# Patient Record
Sex: FEMALE | Race: ASIAN | NOT HISPANIC OR LATINO
[De-identification: names, ages, dates, MRNs, and addresses within clinical notes are randomized per-mention and may not be internally consistent; named-entity substitution may affect disease eponyms.]

---

## 2016-12-23 RX ORDER — SODIUM CHLORIDE 9 MG/ML
1000 INJECTION, SOLUTION INTRAVENOUS
Qty: 0 | Refills: 0 | Status: DISCONTINUED | OUTPATIENT
Start: 2017-01-06 | End: 2017-01-06

## 2016-12-23 RX ORDER — SODIUM CHLORIDE 9 MG/ML
3 INJECTION INTRAMUSCULAR; INTRAVENOUS; SUBCUTANEOUS EVERY 8 HOURS
Qty: 0 | Refills: 0 | Status: DISCONTINUED | OUTPATIENT
Start: 2017-01-06 | End: 2017-01-06

## 2016-12-23 NOTE — H&P PST ADULT - NEGATIVE MUSCULOSKELETAL SYMPTOMS
no muscle cramps/no arm pain R/no myalgia/no back pain/no stiffness/no arthralgia/no joint swelling/no arthritis/no leg pain L/no neck pain/no arm pain L/no leg pain R/no muscle weakness

## 2016-12-23 NOTE — H&P PST ADULT - RS GEN PE MLT RESP DETAILS PC
no rhonchi/no chest wall tenderness/no intercostal retractions/no rales/airway patent/respirations non-labored/breath sounds equal/clear to auscultation bilaterally/good air movement/no subcutaneous emphysema/no wheezes

## 2016-12-23 NOTE — H&P PST ADULT - NEGATIVE GENERAL SYMPTOMS
no weight loss/no sweating/no fever/no anorexia/no fatigue/no weight gain/no polyuria/no polydipsia/no malaise/no polyphagia

## 2016-12-23 NOTE — H&P PST ADULT - NEGATIVE CARDIOVASCULAR SYMPTOMS
no palpitations/no peripheral edema/no chest pain/no claudication/no orthopnea/no dyspnea on exertion

## 2016-12-23 NOTE — H&P PST ADULT - PMH
Enlargement of Lymph Node    History of chemotherapy  2011  History of radiation therapy  2011  Malignant neoplasm of nasopharynx, unspecified

## 2016-12-23 NOTE — H&P PST ADULT - NEGATIVE ENMT SYMPTOMS
no nasal obstruction/no recurrent cold sores/no throat pain/no dry mouth/no nasal congestion/no sinus symptoms/no abnormal taste sensation/no post-nasal discharge/no ear pain/no nasal discharge/no gum bleeding/no hearing difficulty/no nose bleeds/no dysphagia

## 2016-12-23 NOTE — H&P PST ADULT - HISTORY OF PRESENT ILLNESS
57 y/o  female with right neck mass x 2 months s/p biopsy and PET scan.  Pt is now scheduled for Direct Laryngoscopy with biopsy, Tonsillectomy on 08/26/11. 55 y/o  female with hx of right neck mass in 2011. S/P Direct Laryngoscopy with biopsy, Tonsillectomy on 08/26/11. S/P chemo / radiation therapy (10/2011). Pt presents to PST for pre op evaluation stated that follow up MRI x2 (09/2016, 10/2016) were abnormal. S/P biopsy of neck 10/2016. Pre op diagnosis malignant neoplasm of nasopharynx, unspecified. Now scheduled for right neck dissection, possible right parotidectomy on 01/06/2017

## 2016-12-23 NOTE — H&P PST ADULT - FAMILY HISTORY
Mother  Still living? Yes, Estimated age: 81-90  Hypertension, Age at diagnosis: Age Unknown     Sibling  Still living? No  Type 2 diabetes mellitus, Age at diagnosis: Age Unknown

## 2016-12-23 NOTE — H&P PST ADULT - NEGATIVE NEUROLOGICAL SYMPTOMS
no tremors/no syncope/no loss of consciousness/no hemiparesis/no paresthesias/no loss of sensation/no weakness/no generalized seizures/no transient paralysis/no difficulty walking/no facial palsy/no vertigo/no focal seizures

## 2016-12-23 NOTE — H&P PST ADULT - NEGATIVE GENERAL GENITOURINARY SYMPTOMS
no hematuria/no flank pain R/no flank pain L/no bladder infections/no renal colic/normal libido/no urinary hesitancy/no incontinence no flank pain R/no bladder infections/no incontinence/no urinary hesitancy/no renal colic/normal libido/no dysuria/no nocturia/normal urinary frequency/no hematuria/no flank pain L

## 2016-12-23 NOTE — H&P PST ADULT - NEGATIVE GASTROINTESTINAL SYMPTOMS
no abdominal pain/no vomiting/no constipation/no nausea/no diarrhea/no change in bowel habits/no melena

## 2016-12-23 NOTE — H&P PST ADULT - NEGATIVE SKIN SYMPTOMS
no dryness/no brittle nails/no change in size/color of mole/no pitted nails/no hair loss/no itching/no rash no rash/no brittle nails/no pitted nails/no change in size/color of mole/no hair loss/no itching

## 2016-12-23 NOTE — H&P PST ADULT - NSANTHOSAYNRD_GEN_A_CORE
No. JAKE screening performed.  STOP BANG Legend: 0-2 = LOW Risk; 3-4 = INTERMEDIATE Risk; 5-8 = HIGH Risk

## 2016-12-23 NOTE — H&P PST ADULT - NEGATIVE BREAST SYMPTOMS
no nipple discharge L/no breast tenderness R/no breast lump R/no nipple discharge R/no breast tenderness L/no breast lump L

## 2016-12-23 NOTE — H&P PST ADULT - PROBLEM SELECTOR PLAN 1
Scheduled for right neck dissection, possible right parotidectomy on 01/06/17. Pre op instructions, famotidine given and explained. Pt verbalized understanding.

## 2016-12-23 NOTE — H&P PST ADULT - NEGATIVE OPHTHALMOLOGIC SYMPTOMS
no discharge L/no irritation R/no loss of vision L/no discharge R/no blurred vision L/no loss of vision R/no scleral injection L/no lacrimation L/no pain R/no blurred vision R/no irritation L/no pain L/no scleral injection R/no lacrimation R/no diplopia/no photophobia

## 2017-01-05 ENCOUNTER — RESULT REVIEW (OUTPATIENT)
Age: 57
End: 2017-01-05

## 2017-01-06 ENCOUNTER — OUTPATIENT (OUTPATIENT)
Dept: INPATIENT UNIT | Facility: HOSPITAL | Age: 57
LOS: 1 days | Discharge: HOME | End: 2017-01-06
Payer: MEDICAID

## 2017-01-06 ENCOUNTER — APPOINTMENT (OUTPATIENT)
Dept: OTOLARYNGOLOGY | Facility: HOSPITAL | Age: 57
End: 2017-01-06

## 2017-01-06 VITALS
SYSTOLIC BLOOD PRESSURE: 151 MMHG | HEART RATE: 86 BPM | DIASTOLIC BLOOD PRESSURE: 107 MMHG | RESPIRATION RATE: 16 BRPM | WEIGHT: 117.95 LBS | OXYGEN SATURATION: 96 % | TEMPERATURE: 98 F | HEIGHT: 61.75 IN

## 2017-01-06 VITALS
DIASTOLIC BLOOD PRESSURE: 90 MMHG | TEMPERATURE: 98 F | OXYGEN SATURATION: 100 % | SYSTOLIC BLOOD PRESSURE: 135 MMHG | HEART RATE: 88 BPM | RESPIRATION RATE: 16 BRPM

## 2017-01-06 DIAGNOSIS — C11.9 MALIGNANT NEOPLASM OF NASOPHARYNX, UNSPECIFIED: ICD-10-CM

## 2017-01-06 DIAGNOSIS — Z98.89 OTHER SPECIFIED POSTPROCEDURAL STATES: Chronic | ICD-10-CM

## 2017-01-06 LAB
HCG UR QL: NEGATIVE — SIGNIFICANT CHANGE UP
RH IG SCN BLD-IMP: POSITIVE — SIGNIFICANT CHANGE UP

## 2017-01-06 PROCEDURE — 88365 INSITU HYBRIDIZATION (FISH): CPT | Mod: 26,59

## 2017-01-06 PROCEDURE — 88307 TISSUE EXAM BY PATHOLOGIST: CPT | Mod: 26

## 2017-01-06 PROCEDURE — 88342 IMHCHEM/IMCYTCHM 1ST ANTB: CPT | Mod: 26

## 2017-01-06 PROCEDURE — 42415 EXCISE PAROTID GLAND/LESION: CPT

## 2017-01-06 PROCEDURE — 88367 INSITU HYBRIDIZATION AUTO: CPT | Mod: 26

## 2017-01-06 PROCEDURE — 88364 INSITU HYBRIDIZATION (FISH): CPT | Mod: 26

## 2017-01-06 RX ORDER — SODIUM CHLORIDE 9 MG/ML
1000 INJECTION, SOLUTION INTRAVENOUS
Qty: 0 | Refills: 0 | Status: DISCONTINUED | OUTPATIENT
Start: 2017-01-06 | End: 2017-01-06

## 2017-01-06 RX ORDER — FENTANYL CITRATE 50 UG/ML
50 INJECTION INTRAVENOUS
Qty: 0 | Refills: 0 | Status: DISCONTINUED | OUTPATIENT
Start: 2017-01-06 | End: 2017-01-06

## 2017-01-06 RX ORDER — OXYCODONE HYDROCHLORIDE 5 MG/1
1 TABLET ORAL
Qty: 20 | Refills: 0 | OUTPATIENT
Start: 2017-01-06 | End: 2017-01-11

## 2017-01-06 RX ORDER — ONDANSETRON 8 MG/1
4 TABLET, FILM COATED ORAL ONCE
Qty: 0 | Refills: 0 | Status: DISCONTINUED | OUTPATIENT
Start: 2017-01-06 | End: 2017-01-06

## 2017-01-06 RX ADMIN — FENTANYL CITRATE 50 MICROGRAM(S): 50 INJECTION INTRAVENOUS at 10:15

## 2017-01-06 RX ADMIN — SODIUM CHLORIDE 60 MILLILITER(S): 9 INJECTION, SOLUTION INTRAVENOUS at 09:45

## 2017-01-06 RX ADMIN — FENTANYL CITRATE 50 MICROGRAM(S): 50 INJECTION INTRAVENOUS at 10:30

## 2017-01-06 RX ADMIN — FENTANYL CITRATE 50 MICROGRAM(S): 50 INJECTION INTRAVENOUS at 09:57

## 2017-01-06 NOTE — ASU DISCHARGE PLAN (ADULT/PEDIATRIC). - NOTIFY
Bleeding that does not stop/Inability to Tolerate Liquids or Foods/Swelling that continues/Fever greater than 101 Swelling that continues/Increased Irritability or Sluggishness/Bleeding that does not stop/Pain not relieved by Medications/Fever greater than 101/Inability to Tolerate Liquids or Foods/Persistent Nausea and Vomiting Bleeding that does not stop/Fever greater than 101/Increased Irritability or Sluggishness/Swelling that continues/Inability to Tolerate Liquids or Foods/Unable to Urinate/Pain not relieved by Medications/Persistent Nausea and Vomiting

## 2017-01-06 NOTE — ASU DISCHARGE PLAN (ADULT/PEDIATRIC). - NURSING INSTRUCTIONS
Advise pt to not take advil x 5 days and while taking percocet Advise pt to not take advil x 5 days and while taking percocet  1 Percocet contains 325mg. of Tylenol (ACETAMINOPHEN) . DO NOT EXCEED 3000mg  of Tylenol in a 24 hour period.

## 2017-01-06 NOTE — ASU DISCHARGE PLAN (ADULT/PEDIATRIC). - MEDICATION SUMMARY - MEDICATIONS TO TAKE
I will START or STAY ON the medications listed below when I get home from the hospital:    acetaminophen-oxyCODONE 325 mg-5 mg oral tablet  -- 1 tab(s) by mouth every 6 hours MDD:4 tabs  -- Caution federal law prohibits the transfer of this drug to any person other  than the person for whom it was prescribed.  May cause drowsiness.  Alcohol may intensify this effect.  Use care when operating dangerous machinery.  This prescription cannot be refilled.  This product contains acetaminophen.  Do not use  with any other product containing acetaminophen to prevent possible liver damage.  Using more of this medication than prescribed may cause serious breathing problems.    -- Indication: For pain    Advil 200 mg oral tablet  -- 2 tab(s) by mouth every 6 hours, As Needed last dose 12/23/2016  -- Indication: For prn

## 2017-01-09 ENCOUNTER — OTHER (OUTPATIENT)
Age: 57
End: 2017-01-09

## 2017-01-17 ENCOUNTER — APPOINTMENT (OUTPATIENT)
Dept: OTOLARYNGOLOGY | Facility: CLINIC | Age: 57
End: 2017-01-17

## 2017-01-17 VITALS
DIASTOLIC BLOOD PRESSURE: 89 MMHG | SYSTOLIC BLOOD PRESSURE: 133 MMHG | HEART RATE: 82 BPM | HEIGHT: 63 IN | BODY MASS INDEX: 20.73 KG/M2 | WEIGHT: 117 LBS

## 2017-01-26 ENCOUNTER — APPOINTMENT (OUTPATIENT)
Dept: DERMATOLOGY | Facility: CLINIC | Age: 57
End: 2017-01-26

## 2017-01-26 VITALS
WEIGHT: 117 LBS | SYSTOLIC BLOOD PRESSURE: 134 MMHG | DIASTOLIC BLOOD PRESSURE: 86 MMHG | BODY MASS INDEX: 20.73 KG/M2 | HEIGHT: 63 IN

## 2017-05-22 ENCOUNTER — APPOINTMENT (OUTPATIENT)
Dept: OTOLARYNGOLOGY | Facility: CLINIC | Age: 57
End: 2017-05-22

## 2017-05-22 VITALS
BODY MASS INDEX: 20.73 KG/M2 | HEIGHT: 63 IN | WEIGHT: 117 LBS | DIASTOLIC BLOOD PRESSURE: 110 MMHG | SYSTOLIC BLOOD PRESSURE: 170 MMHG | HEART RATE: 72 BPM

## 2017-05-22 RX ORDER — IBUPROFEN 400 MG/1
400 TABLET, FILM COATED ORAL
Qty: 40 | Refills: 0 | Status: COMPLETED | COMMUNITY
Start: 2017-01-17 | End: 2017-05-22

## 2017-06-11 ENCOUNTER — FORM ENCOUNTER (OUTPATIENT)
Age: 57
End: 2017-06-11

## 2017-06-12 ENCOUNTER — OUTPATIENT (OUTPATIENT)
Dept: OUTPATIENT SERVICES | Facility: HOSPITAL | Age: 57
LOS: 1 days | End: 2017-06-12
Payer: MEDICAID

## 2017-06-12 ENCOUNTER — APPOINTMENT (OUTPATIENT)
Dept: CT IMAGING | Facility: IMAGING CENTER | Age: 57
End: 2017-06-12

## 2017-06-12 ENCOUNTER — APPOINTMENT (OUTPATIENT)
Dept: MRI IMAGING | Facility: IMAGING CENTER | Age: 57
End: 2017-06-12

## 2017-06-12 DIAGNOSIS — C77.0 SECONDARY AND UNSPECIFIED MALIGNANT NEOPLASM OF LYMPH NODES OF HEAD, FACE AND NECK: ICD-10-CM

## 2017-06-12 DIAGNOSIS — C11.9 MALIGNANT NEOPLASM OF NASOPHARYNX, UNSPECIFIED: ICD-10-CM

## 2017-06-12 DIAGNOSIS — Z98.89 OTHER SPECIFIED POSTPROCEDURAL STATES: Chronic | ICD-10-CM

## 2017-06-12 PROCEDURE — 71250 CT THORAX DX C-: CPT

## 2017-06-12 PROCEDURE — A9585: CPT

## 2017-06-12 PROCEDURE — 70543 MRI ORBT/FAC/NCK W/O &W/DYE: CPT

## 2017-06-21 ENCOUNTER — FORM ENCOUNTER (OUTPATIENT)
Age: 57
End: 2017-06-21

## 2017-06-22 ENCOUNTER — APPOINTMENT (OUTPATIENT)
Dept: ULTRASOUND IMAGING | Facility: IMAGING CENTER | Age: 57
End: 2017-06-22

## 2017-06-22 ENCOUNTER — OUTPATIENT (OUTPATIENT)
Dept: OUTPATIENT SERVICES | Facility: HOSPITAL | Age: 57
LOS: 1 days | End: 2017-06-22
Payer: MEDICAID

## 2017-06-22 ENCOUNTER — RESULT REVIEW (OUTPATIENT)
Age: 57
End: 2017-06-22

## 2017-06-22 DIAGNOSIS — Z98.89 OTHER SPECIFIED POSTPROCEDURAL STATES: Chronic | ICD-10-CM

## 2017-06-22 DIAGNOSIS — K11.8 OTHER DISEASES OF SALIVARY GLANDS: ICD-10-CM

## 2017-06-22 DIAGNOSIS — C11.9 MALIGNANT NEOPLASM OF NASOPHARYNX, UNSPECIFIED: ICD-10-CM

## 2017-06-22 PROCEDURE — 88365 INSITU HYBRIDIZATION (FISH): CPT

## 2017-06-22 PROCEDURE — 88173 CYTOPATH EVAL FNA REPORT: CPT

## 2017-06-22 PROCEDURE — 88305 TISSUE EXAM BY PATHOLOGIST: CPT

## 2017-06-22 PROCEDURE — 76942 ECHO GUIDE FOR BIOPSY: CPT

## 2017-06-22 PROCEDURE — 88172 CYTP DX EVAL FNA 1ST EA SITE: CPT

## 2017-06-22 PROCEDURE — 88341 IMHCHEM/IMCYTCHM EA ADD ANTB: CPT

## 2017-06-22 PROCEDURE — 10022: CPT

## 2017-06-22 PROCEDURE — 88342 IMHCHEM/IMCYTCHM 1ST ANTB: CPT

## 2017-07-11 ENCOUNTER — APPOINTMENT (OUTPATIENT)
Dept: OTOLARYNGOLOGY | Facility: CLINIC | Age: 57
End: 2017-07-11

## 2017-07-17 ENCOUNTER — OUTPATIENT (OUTPATIENT)
Dept: OUTPATIENT SERVICES | Facility: HOSPITAL | Age: 57
LOS: 1 days | Discharge: HOME | End: 2017-07-17

## 2017-07-17 DIAGNOSIS — Z98.89 OTHER SPECIFIED POSTPROCEDURAL STATES: Chronic | ICD-10-CM

## 2017-07-18 ENCOUNTER — APPOINTMENT (OUTPATIENT)
Dept: RADIATION ONCOLOGY | Facility: CLINIC | Age: 57
End: 2017-07-18

## 2017-07-18 VITALS
HEART RATE: 73 BPM | DIASTOLIC BLOOD PRESSURE: 99 MMHG | TEMPERATURE: 96.9 F | HEIGHT: 63 IN | RESPIRATION RATE: 19 BRPM | WEIGHT: 118.5 LBS | BODY MASS INDEX: 21 KG/M2 | SYSTOLIC BLOOD PRESSURE: 158 MMHG | OXYGEN SATURATION: 100 %

## 2017-07-18 RX ORDER — OXYCODONE AND ACETAMINOPHEN 5; 325 MG/1; MG/1
5-325 TABLET ORAL
Qty: 28 | Refills: 0 | Status: DISCONTINUED | COMMUNITY
Start: 2017-01-17 | End: 2017-07-18

## 2017-08-08 ENCOUNTER — OUTPATIENT (OUTPATIENT)
Dept: OUTPATIENT SERVICES | Facility: HOSPITAL | Age: 57
LOS: 1 days | Discharge: HOME | End: 2017-08-08

## 2017-08-08 ENCOUNTER — APPOINTMENT (OUTPATIENT)
Dept: HEMATOLOGY ONCOLOGY | Facility: CLINIC | Age: 57
End: 2017-08-08
Payer: MEDICAID

## 2017-08-08 VITALS
RESPIRATION RATE: 20 BRPM | HEART RATE: 80 BPM | SYSTOLIC BLOOD PRESSURE: 166 MMHG | BODY MASS INDEX: 20.89 KG/M2 | TEMPERATURE: 97.5 F | HEIGHT: 62.72 IN | WEIGHT: 116.4 LBS | OXYGEN SATURATION: 100 % | DIASTOLIC BLOOD PRESSURE: 110 MMHG

## 2017-08-08 DIAGNOSIS — Z98.89 OTHER SPECIFIED POSTPROCEDURAL STATES: Chronic | ICD-10-CM

## 2017-08-08 DIAGNOSIS — C34.90 MALIGNANT NEOPLASM OF UNSPECIFIED PART OF UNSPECIFIED BRONCHUS OR LUNG: ICD-10-CM

## 2017-08-08 PROCEDURE — 99205 OFFICE O/P NEW HI 60 MIN: CPT

## 2017-08-09 DIAGNOSIS — C11.9 MALIGNANT NEOPLASM OF NASOPHARYNX, UNSPECIFIED: ICD-10-CM

## 2017-08-21 ENCOUNTER — APPOINTMENT (OUTPATIENT)
Dept: OTOLARYNGOLOGY | Facility: CLINIC | Age: 57
End: 2017-08-21
Payer: MEDICAID

## 2017-08-21 PROCEDURE — 99214 OFFICE O/P EST MOD 30 MIN: CPT | Mod: 25

## 2017-08-21 PROCEDURE — 31231 NASAL ENDOSCOPY DX: CPT

## 2017-08-28 ENCOUNTER — FORM ENCOUNTER (OUTPATIENT)
Age: 57
End: 2017-08-28

## 2017-08-29 ENCOUNTER — APPOINTMENT (OUTPATIENT)
Dept: NUCLEAR MEDICINE | Facility: IMAGING CENTER | Age: 57
End: 2017-08-29
Payer: MEDICAID

## 2017-08-29 ENCOUNTER — OUTPATIENT (OUTPATIENT)
Dept: OUTPATIENT SERVICES | Facility: HOSPITAL | Age: 57
LOS: 1 days | End: 2017-08-29
Payer: MEDICAID

## 2017-08-29 DIAGNOSIS — C77.0 SECONDARY AND UNSPECIFIED MALIGNANT NEOPLASM OF LYMPH NODES OF HEAD, FACE AND NECK: ICD-10-CM

## 2017-08-29 DIAGNOSIS — Z98.89 OTHER SPECIFIED POSTPROCEDURAL STATES: Chronic | ICD-10-CM

## 2017-08-29 PROCEDURE — 78815 PET IMAGE W/CT SKULL-THIGH: CPT

## 2017-08-29 PROCEDURE — A9552: CPT

## 2017-08-29 PROCEDURE — 78815 PET IMAGE W/CT SKULL-THIGH: CPT | Mod: 26,PS

## 2017-09-07 ENCOUNTER — FORM ENCOUNTER (OUTPATIENT)
Age: 57
End: 2017-09-07

## 2017-09-08 ENCOUNTER — RESULT REVIEW (OUTPATIENT)
Age: 57
End: 2017-09-08

## 2017-09-08 ENCOUNTER — APPOINTMENT (OUTPATIENT)
Dept: ULTRASOUND IMAGING | Facility: IMAGING CENTER | Age: 57
End: 2017-09-08
Payer: MEDICAID

## 2017-09-08 ENCOUNTER — OUTPATIENT (OUTPATIENT)
Dept: OUTPATIENT SERVICES | Facility: HOSPITAL | Age: 57
LOS: 1 days | End: 2017-09-08
Payer: MEDICAID

## 2017-09-08 DIAGNOSIS — C77.0 SECONDARY AND UNSPECIFIED MALIGNANT NEOPLASM OF LYMPH NODES OF HEAD, FACE AND NECK: ICD-10-CM

## 2017-09-08 DIAGNOSIS — Z98.89 OTHER SPECIFIED POSTPROCEDURAL STATES: Chronic | ICD-10-CM

## 2017-09-08 DIAGNOSIS — C11.9 MALIGNANT NEOPLASM OF NASOPHARYNX, UNSPECIFIED: ICD-10-CM

## 2017-09-08 PROCEDURE — 88173 CYTOPATH EVAL FNA REPORT: CPT

## 2017-09-08 PROCEDURE — 88173 CYTOPATH EVAL FNA REPORT: CPT | Mod: 26

## 2017-09-08 PROCEDURE — 88172 CYTP DX EVAL FNA 1ST EA SITE: CPT

## 2017-09-08 PROCEDURE — 76942 ECHO GUIDE FOR BIOPSY: CPT | Mod: 26

## 2017-09-08 PROCEDURE — 10022: CPT

## 2017-09-08 PROCEDURE — 88305 TISSUE EXAM BY PATHOLOGIST: CPT

## 2017-09-08 PROCEDURE — 76942 ECHO GUIDE FOR BIOPSY: CPT

## 2017-09-08 PROCEDURE — 88305 TISSUE EXAM BY PATHOLOGIST: CPT | Mod: 26

## 2017-09-21 ENCOUNTER — OUTPATIENT (OUTPATIENT)
Dept: OUTPATIENT SERVICES | Facility: HOSPITAL | Age: 57
LOS: 1 days | End: 2017-09-21
Payer: MEDICAID

## 2017-09-21 VITALS
DIASTOLIC BLOOD PRESSURE: 100 MMHG | SYSTOLIC BLOOD PRESSURE: 162 MMHG | WEIGHT: 115.08 LBS | TEMPERATURE: 98 F | OXYGEN SATURATION: 98 % | RESPIRATION RATE: 16 BRPM | HEIGHT: 62.5 IN | HEART RATE: 78 BPM

## 2017-09-21 DIAGNOSIS — Z98.89 OTHER SPECIFIED POSTPROCEDURAL STATES: Chronic | ICD-10-CM

## 2017-09-21 DIAGNOSIS — C11.9 MALIGNANT NEOPLASM OF NASOPHARYNX, UNSPECIFIED: ICD-10-CM

## 2017-09-21 LAB
ALBUMIN SERPL ELPH-MCNC: 4.4 G/DL — SIGNIFICANT CHANGE UP (ref 3.3–5)
ALP SERPL-CCNC: 87 U/L — SIGNIFICANT CHANGE UP (ref 40–120)
ALT FLD-CCNC: 19 U/L — SIGNIFICANT CHANGE UP (ref 4–33)
AST SERPL-CCNC: 23 U/L — SIGNIFICANT CHANGE UP (ref 4–32)
BILIRUB SERPL-MCNC: 0.3 MG/DL — SIGNIFICANT CHANGE UP (ref 0.2–1.2)
BUN SERPL-MCNC: 13 MG/DL — SIGNIFICANT CHANGE UP (ref 7–23)
CALCIUM SERPL-MCNC: 9.3 MG/DL — SIGNIFICANT CHANGE UP (ref 8.4–10.5)
CHLORIDE SERPL-SCNC: 103 MMOL/L — SIGNIFICANT CHANGE UP (ref 98–107)
CO2 SERPL-SCNC: 27 MMOL/L — SIGNIFICANT CHANGE UP (ref 22–31)
CREAT SERPL-MCNC: 0.66 MG/DL — SIGNIFICANT CHANGE UP (ref 0.5–1.3)
GLUCOSE SERPL-MCNC: 88 MG/DL — SIGNIFICANT CHANGE UP (ref 70–99)
HCT VFR BLD CALC: 39.5 % — SIGNIFICANT CHANGE UP (ref 34.5–45)
HGB BLD-MCNC: 13.6 G/DL — SIGNIFICANT CHANGE UP (ref 11.5–15.5)
MCHC RBC-ENTMCNC: 30.8 PG — SIGNIFICANT CHANGE UP (ref 27–34)
MCHC RBC-ENTMCNC: 34.4 % — SIGNIFICANT CHANGE UP (ref 32–36)
MCV RBC AUTO: 89.4 FL — SIGNIFICANT CHANGE UP (ref 80–100)
NRBC # FLD: 0 — SIGNIFICANT CHANGE UP
PLATELET # BLD AUTO: 263 K/UL — SIGNIFICANT CHANGE UP (ref 150–400)
PMV BLD: 9.5 FL — SIGNIFICANT CHANGE UP (ref 7–13)
POTASSIUM SERPL-MCNC: 3.8 MMOL/L — SIGNIFICANT CHANGE UP (ref 3.5–5.3)
POTASSIUM SERPL-SCNC: 3.8 MMOL/L — SIGNIFICANT CHANGE UP (ref 3.5–5.3)
PROT SERPL-MCNC: 7.3 G/DL — SIGNIFICANT CHANGE UP (ref 6–8.3)
RBC # BLD: 4.42 M/UL — SIGNIFICANT CHANGE UP (ref 3.8–5.2)
RBC # FLD: 12.9 % — SIGNIFICANT CHANGE UP (ref 10.3–14.5)
SODIUM SERPL-SCNC: 143 MMOL/L — SIGNIFICANT CHANGE UP (ref 135–145)
WBC # BLD: 5.17 K/UL — SIGNIFICANT CHANGE UP (ref 3.8–10.5)
WBC # FLD AUTO: 5.17 K/UL — SIGNIFICANT CHANGE UP (ref 3.8–10.5)

## 2017-09-21 PROCEDURE — 93010 ELECTROCARDIOGRAM REPORT: CPT

## 2017-09-21 RX ORDER — SODIUM CHLORIDE 9 MG/ML
1000 INJECTION, SOLUTION INTRAVENOUS
Qty: 0 | Refills: 0 | Status: DISCONTINUED | OUTPATIENT
Start: 2017-10-04 | End: 2017-10-04

## 2017-09-21 NOTE — H&P PST ADULT - ABILITY TO HEAR (WITH HEARING AID OR HEARING APPLIANCE IF NORMALLY USED):
Adequate: hears normal conversation without difficulty right ear has diminished hearing/Mildly to Moderately Impaired: difficulty hearing in some environments or speaker may need to increase volume or speak distinctly

## 2017-09-21 NOTE — H&P PST ADULT - NEGATIVE NEUROLOGICAL SYMPTOMS
no loss of consciousness/no hemiparesis/no facial palsy/no difficulty walking/no generalized seizures/no paresthesias/no focal seizures/no syncope/no transient paralysis/no vertigo/no loss of sensation/no tremors/no weakness

## 2017-09-21 NOTE — H&P PST ADULT - NEGATIVE CARDIOVASCULAR SYMPTOMS
no chest pain/no dyspnea on exertion/no palpitations/no claudication/no orthopnea/no peripheral edema

## 2017-09-21 NOTE — H&P PST ADULT - LANGUAGE ASSISTANCE NEEDED
speaking English at this PST visit  with full understanding/No-Patient/Caregiver offered and refused free interpretation services.

## 2017-09-21 NOTE — H&P PST ADULT - NEGATIVE GENERAL GENITOURINARY SYMPTOMS
no incontinence/normal urinary frequency/no nocturia/no bladder infections/no hematuria/no renal colic/no flank pain L/no flank pain R/normal libido/no dysuria/no urinary hesitancy

## 2017-09-21 NOTE — H&P PST ADULT - NEGATIVE OPHTHALMOLOGIC SYMPTOMS
no discharge L/no diplopia/no irritation L/no blurred vision L/no blurred vision R/no loss of vision R/no scleral injection R/no scleral injection L/no discharge R/no pain L/no lacrimation L/no photophobia/no pain R/no lacrimation R/no irritation R/no loss of vision L no blurred vision L/no blurred vision R/no diplopia/no photophobia

## 2017-09-21 NOTE — H&P PST ADULT - ENT GEN HX ROS MEA POS PC
epistaxis/tinnitus/vertigo/"The doctor said the nose bleed is from the radiation" right parotid mass

## 2017-09-21 NOTE — H&P PST ADULT - NEGATIVE SKIN SYMPTOMS
no itching/no pitted nails/no change in size/color of mole/no rash/no brittle nails/no hair loss no itching/no hair loss/no change in size/color of mole/no brittle nails/no pitted nails

## 2017-09-21 NOTE — H&P PST ADULT - NEGATIVE ENMT SYMPTOMS
no sinus symptoms/no nasal congestion/no nasal obstruction/no post-nasal discharge/no nose bleeds/no recurrent cold sores/no ear pain/no gum bleeding/no nasal discharge/no hearing difficulty/no throat pain/no dysphagia/no abnormal taste sensation/no dry mouth no nasal obstruction/no hearing difficulty/no dysphagia/no nasal congestion/no nasal discharge/no recurrent cold sores/no post-nasal discharge/no nose bleeds/no sinus symptoms

## 2017-09-21 NOTE — H&P PST ADULT - NEGATIVE GASTROINTESTINAL SYMPTOMS
no change in bowel habits/no nausea/no constipation/no abdominal pain/no diarrhea/no melena/no vomiting

## 2017-09-21 NOTE — H&P PST ADULT - NEGATIVE BREAST SYMPTOMS
no nipple discharge L/no breast lump L/no breast lump R/no nipple discharge R/no breast tenderness R/no breast tenderness L

## 2017-09-21 NOTE — H&P PST ADULT - PROBLEM SELECTOR PLAN 1
scheduled for right parotidectomy with frozen section on 10/4/2017  preop instructions, Gi prophylaxis & surgical soap given  pt verbalized understanding

## 2017-09-21 NOTE — H&P PST ADULT - NEGATIVE MUSCULOSKELETAL SYMPTOMS
no arm pain L/no muscle cramps/no arthritis/no joint swelling/no stiffness/no arm pain R/no leg pain L/no myalgia/no arthralgia/no muscle weakness/no neck pain/no back pain/no leg pain R

## 2017-09-21 NOTE — H&P PST ADULT - PMH
Enlargement of Lymph Node    History of chemotherapy  2011  History of radiation therapy  2011  Malignant neoplasm of nasopharynx, unspecified Enlargement of Lymph Node    History of chemotherapy  2011  History of radiation therapy  2011  Malignant neoplasm of nasopharynx, unspecified    Psoriasis

## 2017-09-22 ENCOUNTER — NON-APPOINTMENT (OUTPATIENT)
Age: 57
End: 2017-09-22

## 2017-09-22 ENCOUNTER — APPOINTMENT (OUTPATIENT)
Dept: INTERNAL MEDICINE | Facility: CLINIC | Age: 57
End: 2017-09-22
Payer: MEDICAID

## 2017-09-22 VITALS
DIASTOLIC BLOOD PRESSURE: 91 MMHG | SYSTOLIC BLOOD PRESSURE: 137 MMHG | OXYGEN SATURATION: 99 % | RESPIRATION RATE: 12 BRPM | TEMPERATURE: 98.3 F | HEIGHT: 62 IN | WEIGHT: 117 LBS | BODY MASS INDEX: 21.53 KG/M2 | HEART RATE: 71 BPM

## 2017-09-22 DIAGNOSIS — Z01.818 ENCOUNTER FOR OTHER PREPROCEDURAL EXAMINATION: ICD-10-CM

## 2017-09-22 PROCEDURE — 99214 OFFICE O/P EST MOD 30 MIN: CPT

## 2017-09-22 PROCEDURE — 93000 ELECTROCARDIOGRAM COMPLETE: CPT

## 2017-09-22 RX ORDER — DEXAMETHASONE 4 MG/1
4 TABLET ORAL TWICE DAILY
Qty: 60 | Refills: 2 | Status: DISCONTINUED | COMMUNITY
Start: 2017-08-08 | End: 2017-09-22

## 2017-09-22 RX ORDER — METOCLOPRAMIDE 10 MG/1
10 TABLET ORAL
Qty: 60 | Refills: 3 | Status: DISCONTINUED | COMMUNITY
Start: 2017-08-08 | End: 2017-09-22

## 2017-09-25 LAB
ALBUMIN SERPL ELPH-MCNC: 4.6 G/DL
ALP BLD-CCNC: 91 U/L
ALT SERPL-CCNC: 18 U/L
ANION GAP SERPL CALC-SCNC: 15 MMOL/L
APTT BLD: 38.9 SEC
AST SERPL-CCNC: 25 U/L
BASOPHILS # BLD AUTO: 0.02 K/UL
BASOPHILS NFR BLD AUTO: 0.4 %
BILIRUB SERPL-MCNC: 0.4 MG/DL
BUN SERPL-MCNC: 18 MG/DL
CALCIUM SERPL-MCNC: 9.9 MG/DL
CHLORIDE SERPL-SCNC: 101 MMOL/L
CO2 SERPL-SCNC: 25 MMOL/L
CREAT SERPL-MCNC: 0.75 MG/DL
EOSINOPHIL # BLD AUTO: 0.09 K/UL
EOSINOPHIL NFR BLD AUTO: 2 %
GLUCOSE SERPL-MCNC: 92 MG/DL
HCT VFR BLD CALC: 41 %
HGB BLD-MCNC: 13.1 G/DL
IMM GRANULOCYTES NFR BLD AUTO: 0.2 %
INR PPP: 0.92 RATIO
LYMPHOCYTES # BLD AUTO: 1.07 K/UL
LYMPHOCYTES NFR BLD AUTO: 23.9 %
MAN DIFF?: NORMAL
MCHC RBC-ENTMCNC: 29.9 PG
MCHC RBC-ENTMCNC: 32 GM/DL
MCV RBC AUTO: 93.6 FL
MONOCYTES # BLD AUTO: 0.26 K/UL
MONOCYTES NFR BLD AUTO: 5.8 %
NEUTROPHILS # BLD AUTO: 3.03 K/UL
NEUTROPHILS NFR BLD AUTO: 67.7 %
PLATELET # BLD AUTO: 269 K/UL
POTASSIUM SERPL-SCNC: 4.3 MMOL/L
PROT SERPL-MCNC: 7.1 G/DL
PT BLD: 10.4 SEC
RBC # BLD: 4.38 M/UL
RBC # FLD: 14 %
SODIUM SERPL-SCNC: 141 MMOL/L
WBC # FLD AUTO: 4.48 K/UL

## 2017-10-03 NOTE — ASU PATIENT PROFILE, ADULT - PMH
Enlargement of Lymph Node    History of chemotherapy  2011  History of radiation therapy  2011  Malignant neoplasm of nasopharynx, unspecified    Psoriasis

## 2017-10-03 NOTE — ASU PATIENT PROFILE, ADULT - LANGUAGE ASSISTANCE NEEDED
No-Patient/Caregiver offered and refused free interpretation services./speaking English at this PST visit  with full understanding

## 2017-10-03 NOTE — ASU PATIENT PROFILE, ADULT - ABILITY TO HEAR (WITH HEARING AID OR HEARING APPLIANCE IF NORMALLY USED):
right ear has diminished hearing/Mildly to Moderately Impaired: difficulty hearing in some environments or speaker may need to increase volume or speak distinctly

## 2017-10-04 ENCOUNTER — RESULT REVIEW (OUTPATIENT)
Age: 57
End: 2017-10-04

## 2017-10-04 ENCOUNTER — OUTPATIENT (OUTPATIENT)
Dept: OUTPATIENT SERVICES | Facility: HOSPITAL | Age: 57
LOS: 1 days | Discharge: HOME | End: 2017-10-04
Payer: MEDICAID

## 2017-10-04 ENCOUNTER — APPOINTMENT (OUTPATIENT)
Dept: OTOLARYNGOLOGY | Facility: HOSPITAL | Age: 57
End: 2017-10-04

## 2017-10-04 VITALS
OXYGEN SATURATION: 100 % | SYSTOLIC BLOOD PRESSURE: 170 MMHG | RESPIRATION RATE: 16 BRPM | HEART RATE: 76 BPM | DIASTOLIC BLOOD PRESSURE: 109 MMHG | HEIGHT: 62.5 IN | WEIGHT: 115.08 LBS | TEMPERATURE: 98 F

## 2017-10-04 VITALS
HEART RATE: 88 BPM | DIASTOLIC BLOOD PRESSURE: 89 MMHG | RESPIRATION RATE: 14 BRPM | OXYGEN SATURATION: 95 % | SYSTOLIC BLOOD PRESSURE: 136 MMHG

## 2017-10-04 DIAGNOSIS — Z98.89 OTHER SPECIFIED POSTPROCEDURAL STATES: Chronic | ICD-10-CM

## 2017-10-04 DIAGNOSIS — C11.9 MALIGNANT NEOPLASM OF NASOPHARYNX, UNSPECIFIED: ICD-10-CM

## 2017-10-04 LAB — HCG UR QL: NEGATIVE — SIGNIFICANT CHANGE UP

## 2017-10-04 PROCEDURE — 42415 EXCISE PAROTID GLAND/LESION: CPT | Mod: GC

## 2017-10-04 PROCEDURE — 88305 TISSUE EXAM BY PATHOLOGIST: CPT | Mod: 26

## 2017-10-04 PROCEDURE — 88307 TISSUE EXAM BY PATHOLOGIST: CPT | Mod: 26

## 2017-10-04 RX ORDER — CEPHALEXIN 500 MG
500 CAPSULE ORAL
Qty: 5000 | Refills: 0
Start: 2017-10-04 | End: 2017-10-09

## 2017-10-04 RX ORDER — OXYCODONE HYDROCHLORIDE 5 MG/1
2.5 TABLET ORAL ONCE
Qty: 0 | Refills: 0 | Status: DISCONTINUED | OUTPATIENT
Start: 2017-10-04 | End: 2017-10-04

## 2017-10-04 RX ORDER — HYDROMORPHONE HYDROCHLORIDE 2 MG/ML
0.5 INJECTION INTRAMUSCULAR; INTRAVENOUS; SUBCUTANEOUS
Qty: 0 | Refills: 0 | Status: DISCONTINUED | OUTPATIENT
Start: 2017-10-04 | End: 2017-10-04

## 2017-10-04 RX ORDER — HYDROMORPHONE HYDROCHLORIDE 2 MG/ML
0.25 INJECTION INTRAMUSCULAR; INTRAVENOUS; SUBCUTANEOUS
Qty: 0 | Refills: 0 | Status: DISCONTINUED | OUTPATIENT
Start: 2017-10-04 | End: 2017-10-04

## 2017-10-04 RX ORDER — SODIUM CHLORIDE 9 MG/ML
1000 INJECTION, SOLUTION INTRAVENOUS
Qty: 0 | Refills: 0 | Status: DISCONTINUED | OUTPATIENT
Start: 2017-10-04 | End: 2017-10-19

## 2017-10-04 RX ORDER — CEPHALEXIN 500 MG
500 CAPSULE ORAL
Qty: 5000 | Refills: 0 | OUTPATIENT
Start: 2017-10-04 | End: 2017-10-09

## 2017-10-04 RX ORDER — IBUPROFEN 200 MG
2 TABLET ORAL
Qty: 0 | Refills: 0 | COMMUNITY

## 2017-10-04 RX ORDER — ONDANSETRON 8 MG/1
4 TABLET, FILM COATED ORAL ONCE
Qty: 0 | Refills: 0 | Status: DISCONTINUED | OUTPATIENT
Start: 2017-10-04 | End: 2017-10-04

## 2017-10-04 RX ORDER — OXYCODONE AND ACETAMINOPHEN 5; 325 MG/1; MG/1
2 TABLET ORAL EVERY 6 HOURS
Qty: 0 | Refills: 0 | Status: DISCONTINUED | OUTPATIENT
Start: 2017-10-04 | End: 2017-10-04

## 2017-10-04 RX ORDER — OXYCODONE AND ACETAMINOPHEN 5; 325 MG/1; MG/1
1 TABLET ORAL EVERY 6 HOURS
Qty: 0 | Refills: 0 | Status: DISCONTINUED | OUTPATIENT
Start: 2017-10-04 | End: 2017-10-04

## 2017-10-04 RX ADMIN — SODIUM CHLORIDE 30 MILLILITER(S): 9 INJECTION, SOLUTION INTRAVENOUS at 07:00

## 2017-10-04 RX ADMIN — HYDROMORPHONE HYDROCHLORIDE 0.25 MILLIGRAM(S): 2 INJECTION INTRAMUSCULAR; INTRAVENOUS; SUBCUTANEOUS at 12:17

## 2017-10-04 RX ADMIN — HYDROMORPHONE HYDROCHLORIDE 0.5 MILLIGRAM(S): 2 INJECTION INTRAMUSCULAR; INTRAVENOUS; SUBCUTANEOUS at 14:49

## 2017-10-04 RX ADMIN — HYDROMORPHONE HYDROCHLORIDE 0.25 MILLIGRAM(S): 2 INJECTION INTRAMUSCULAR; INTRAVENOUS; SUBCUTANEOUS at 12:47

## 2017-10-04 RX ADMIN — SODIUM CHLORIDE 50 MILLILITER(S): 9 INJECTION, SOLUTION INTRAVENOUS at 11:00

## 2017-10-04 RX ADMIN — HYDROMORPHONE HYDROCHLORIDE 0.5 MILLIGRAM(S): 2 INJECTION INTRAMUSCULAR; INTRAVENOUS; SUBCUTANEOUS at 14:17

## 2017-10-04 NOTE — ASU DISCHARGE PLAN (ADULT/PEDIATRIC). - NURSING INSTRUCTIONS
When taking pain meds - take with food and know it may cause constipation and nausea - Do NOT drive while on narcotics.

## 2017-10-04 NOTE — ASU DISCHARGE PLAN (ADULT/PEDIATRIC). - MEDICATION SUMMARY - MEDICATIONS TO TAKE
I will START or STAY ON the medications listed below when I get home from the hospital:    eye drop (OTC)  for redness in eye  -- 1 drop(s) in each eye , As Needed  -- Indication: For eye care    oxyCODONE-acetaminophen 5 mg-325 mg oral tablet  -- 1 tab(s) by mouth every 6 hours MDD:4  -- Caution federal law prohibits the transfer of this drug to any person other  than the person for whom it was prescribed.  May cause drowsiness.  Alcohol may intensify this effect.  Use care when operating dangerous machinery.  This prescription cannot be refilled.  This product contains acetaminophen.  Do not use  with any other product containing acetaminophen to prevent possible liver damage.  Using more of this medication than prescribed may cause serious breathing problems.    -- Indication: For pain    Keflex 500 mg oral capsule  -- 500 cap(s) by mouth 2 times a day   -- Finish all this medication unless otherwise directed by prescriber.    -- Indication: For prophylaxis

## 2017-10-04 NOTE — ASU DISCHARGE PLAN (ADULT/PEDIATRIC). - INSTRUCTIONS
You will be contacted by Dr. Hernandez's office with your follow up appointment Call Dr. Hernandez's office to confirm your follow-up appointment

## 2017-10-04 NOTE — ASU DISCHARGE PLAN (ADULT/PEDIATRIC). - NOTIFY
Fever greater than 101 Bleeding that does not stop/Swelling that continues/Fever greater than 101 Swelling that continues/Fever greater than 101/Unable to Urinate/Bleeding that does not stop/Persistent Nausea and Vomiting/Pain not relieved by Medications

## 2017-10-10 ENCOUNTER — TRANSCRIPTION ENCOUNTER (OUTPATIENT)
Age: 57
End: 2017-10-10

## 2017-10-11 ENCOUNTER — APPOINTMENT (OUTPATIENT)
Dept: OTOLARYNGOLOGY | Facility: CLINIC | Age: 57
End: 2017-10-11
Payer: MEDICAID

## 2017-10-11 PROCEDURE — 99024 POSTOP FOLLOW-UP VISIT: CPT

## 2017-10-19 ENCOUNTER — TRANSCRIPTION ENCOUNTER (OUTPATIENT)
Age: 57
End: 2017-10-19

## 2017-11-06 ENCOUNTER — APPOINTMENT (OUTPATIENT)
Dept: OTOLARYNGOLOGY | Facility: CLINIC | Age: 57
End: 2017-11-06
Payer: MEDICAID

## 2017-11-06 VITALS
RESPIRATION RATE: 18 BRPM | BODY MASS INDEX: 21.53 KG/M2 | HEART RATE: 76 BPM | HEIGHT: 62 IN | DIASTOLIC BLOOD PRESSURE: 100 MMHG | SYSTOLIC BLOOD PRESSURE: 159 MMHG | WEIGHT: 117 LBS

## 2017-11-06 PROCEDURE — 31231 NASAL ENDOSCOPY DX: CPT | Mod: 58

## 2017-11-06 PROCEDURE — 99024 POSTOP FOLLOW-UP VISIT: CPT

## 2017-11-06 RX ORDER — OXYCODONE AND ACETAMINOPHEN 5; 325 MG/1; MG/1
5-325 TABLET ORAL
Qty: 28 | Refills: 0 | Status: COMPLETED | COMMUNITY
Start: 2017-10-10 | End: 2017-11-06

## 2017-11-06 RX ORDER — CEPHALEXIN 500 MG/1
500 CAPSULE ORAL
Qty: 10 | Refills: 0 | Status: COMPLETED | COMMUNITY
Start: 2017-10-04 | End: 2017-11-06

## 2017-12-04 ENCOUNTER — APPOINTMENT (OUTPATIENT)
Dept: OTOLARYNGOLOGY | Facility: CLINIC | Age: 57
End: 2017-12-04
Payer: MEDICAID

## 2017-12-04 VITALS
SYSTOLIC BLOOD PRESSURE: 146 MMHG | RESPIRATION RATE: 18 BRPM | HEART RATE: 88 BPM | DIASTOLIC BLOOD PRESSURE: 108 MMHG | HEIGHT: 62 IN | WEIGHT: 117 LBS | BODY MASS INDEX: 21.53 KG/M2

## 2017-12-04 DIAGNOSIS — Z09 ENCOUNTER FOR FOLLOW-UP EXAMINATION AFTER COMPLETED TREATMENT FOR CONDITIONS OTHER THAN MALIGNANT NEOPLASM: ICD-10-CM

## 2017-12-04 PROCEDURE — 99024 POSTOP FOLLOW-UP VISIT: CPT

## 2017-12-18 ENCOUNTER — APPOINTMENT (OUTPATIENT)
Dept: OTOLARYNGOLOGY | Facility: CLINIC | Age: 57
End: 2017-12-18
Payer: MEDICAID

## 2017-12-18 VITALS
HEIGHT: 62 IN | HEART RATE: 87 BPM | SYSTOLIC BLOOD PRESSURE: 165 MMHG | DIASTOLIC BLOOD PRESSURE: 113 MMHG | WEIGHT: 117 LBS | BODY MASS INDEX: 21.53 KG/M2

## 2017-12-18 PROCEDURE — 99024 POSTOP FOLLOW-UP VISIT: CPT

## 2017-12-18 RX ORDER — OFLOXACIN OTIC 3 MG/ML
0.3 SOLUTION AURICULAR (OTIC) TWICE DAILY
Qty: 1 | Refills: 0 | Status: COMPLETED | COMMUNITY
Start: 2017-12-04 | End: 2017-12-18

## 2017-12-18 RX ORDER — OMEPRAZOLE 20 MG/1
20 CAPSULE, DELAYED RELEASE ORAL DAILY
Qty: 7 | Refills: 0 | Status: COMPLETED | COMMUNITY
Start: 2017-10-20 | End: 2017-12-18

## 2017-12-18 RX ORDER — IBUPROFEN 400 MG/1
400 TABLET, FILM COATED ORAL EVERY 6 HOURS
Qty: 40 | Refills: 0 | Status: COMPLETED | COMMUNITY
Start: 2017-10-20 | End: 2017-12-18

## 2017-12-18 RX ORDER — IBUPROFEN 400 MG
400 TABLET ORAL
Refills: 0 | Status: COMPLETED | COMMUNITY
End: 2017-12-18

## 2017-12-26 ENCOUNTER — APPOINTMENT (OUTPATIENT)
Dept: INTERNAL MEDICINE | Facility: CLINIC | Age: 57
End: 2017-12-26
Payer: MEDICAID

## 2017-12-26 VITALS — SYSTOLIC BLOOD PRESSURE: 150 MMHG | DIASTOLIC BLOOD PRESSURE: 100 MMHG

## 2017-12-26 VITALS
BODY MASS INDEX: 21.71 KG/M2 | SYSTOLIC BLOOD PRESSURE: 166 MMHG | HEIGHT: 62 IN | HEART RATE: 90 BPM | WEIGHT: 118 LBS | RESPIRATION RATE: 12 BRPM | TEMPERATURE: 98.1 F | DIASTOLIC BLOOD PRESSURE: 117 MMHG | OXYGEN SATURATION: 98 %

## 2017-12-26 DIAGNOSIS — T45.1X5A NAUSEA WITH VOMITING, UNSPECIFIED: ICD-10-CM

## 2017-12-26 DIAGNOSIS — R11.2 NAUSEA WITH VOMITING, UNSPECIFIED: ICD-10-CM

## 2017-12-26 DIAGNOSIS — I10 ESSENTIAL (PRIMARY) HYPERTENSION: ICD-10-CM

## 2017-12-26 PROCEDURE — 99214 OFFICE O/P EST MOD 30 MIN: CPT

## 2018-01-08 ENCOUNTER — APPOINTMENT (OUTPATIENT)
Dept: CARDIOLOGY | Facility: CLINIC | Age: 58
End: 2018-01-08

## 2018-01-13 ENCOUNTER — FORM ENCOUNTER (OUTPATIENT)
Age: 58
End: 2018-01-13

## 2018-01-14 ENCOUNTER — APPOINTMENT (OUTPATIENT)
Dept: MRI IMAGING | Facility: IMAGING CENTER | Age: 58
End: 2018-01-14
Payer: MEDICAID

## 2018-01-14 ENCOUNTER — OUTPATIENT (OUTPATIENT)
Dept: OUTPATIENT SERVICES | Facility: HOSPITAL | Age: 58
LOS: 1 days | End: 2018-01-14
Payer: MEDICAID

## 2018-01-14 DIAGNOSIS — C11.9 MALIGNANT NEOPLASM OF NASOPHARYNX, UNSPECIFIED: ICD-10-CM

## 2018-01-14 DIAGNOSIS — Z98.89 OTHER SPECIFIED POSTPROCEDURAL STATES: Chronic | ICD-10-CM

## 2018-01-14 PROCEDURE — 70542 MRI ORBIT/FACE/NECK W/DYE: CPT

## 2018-01-14 PROCEDURE — 70542 MRI ORBIT/FACE/NECK W/DYE: CPT | Mod: 26

## 2018-01-14 PROCEDURE — A9585: CPT

## 2018-01-15 LAB
ALBUMIN SERPL ELPH-MCNC: 4 G/DL
ALP BLD-CCNC: 91 U/L
ALT SERPL-CCNC: 16 U/L
ANION GAP SERPL CALC-SCNC: 11 MMOL/L
AST SERPL-CCNC: 21 U/L
BILIRUB SERPL-MCNC: 0.4 MG/DL
BUN SERPL-MCNC: 12 MG/DL
CALCIUM SERPL-MCNC: 9 MG/DL
CHLORIDE SERPL-SCNC: 104 MMOL/L
CO2 SERPL-SCNC: 29 MMOL/L
CREAT SERPL-MCNC: 0.82 MG/DL
CREAT SPEC-SCNC: 82 MG/DL
GLUCOSE SERPL-MCNC: 104 MG/DL
MICROALBUMIN 24H UR DL<=1MG/L-MCNC: 5.7 MG/DL
MICROALBUMIN/CREAT 24H UR-RTO: 70 MG/G
POTASSIUM SERPL-SCNC: 4 MMOL/L
PROT SERPL-MCNC: 7 G/DL
SODIUM SERPL-SCNC: 144 MMOL/L

## 2018-01-22 ENCOUNTER — APPOINTMENT (OUTPATIENT)
Dept: CARDIOLOGY | Facility: CLINIC | Age: 58
End: 2018-01-22
Payer: MEDICAID

## 2018-01-22 PROCEDURE — 93306 TTE W/DOPPLER COMPLETE: CPT

## 2018-01-29 ENCOUNTER — APPOINTMENT (OUTPATIENT)
Dept: OTOLARYNGOLOGY | Facility: CLINIC | Age: 58
End: 2018-01-29
Payer: MEDICAID

## 2018-01-29 VITALS
WEIGHT: 118 LBS | HEIGHT: 62 IN | SYSTOLIC BLOOD PRESSURE: 141 MMHG | HEART RATE: 92 BPM | DIASTOLIC BLOOD PRESSURE: 100 MMHG | BODY MASS INDEX: 21.71 KG/M2

## 2018-01-29 PROCEDURE — 92504 EAR MICROSCOPY EXAMINATION: CPT

## 2018-01-29 PROCEDURE — 99214 OFFICE O/P EST MOD 30 MIN: CPT | Mod: 25

## 2018-01-29 PROCEDURE — 31231 NASAL ENDOSCOPY DX: CPT

## 2018-02-06 ENCOUNTER — APPOINTMENT (OUTPATIENT)
Dept: INTERNAL MEDICINE | Facility: CLINIC | Age: 58
End: 2018-02-06

## 2018-04-30 ENCOUNTER — APPOINTMENT (OUTPATIENT)
Dept: OTOLARYNGOLOGY | Facility: CLINIC | Age: 58
End: 2018-04-30
Payer: MEDICAID

## 2018-04-30 VITALS — SYSTOLIC BLOOD PRESSURE: 176 MMHG | DIASTOLIC BLOOD PRESSURE: 100 MMHG | HEART RATE: 80 BPM

## 2018-04-30 PROCEDURE — 99214 OFFICE O/P EST MOD 30 MIN: CPT | Mod: 25

## 2018-04-30 PROCEDURE — 31231 NASAL ENDOSCOPY DX: CPT

## 2018-05-20 ENCOUNTER — FORM ENCOUNTER (OUTPATIENT)
Age: 58
End: 2018-05-20

## 2018-05-21 ENCOUNTER — APPOINTMENT (OUTPATIENT)
Dept: CT IMAGING | Facility: IMAGING CENTER | Age: 58
End: 2018-05-21
Payer: MEDICAID

## 2018-05-21 ENCOUNTER — OUTPATIENT (OUTPATIENT)
Dept: OUTPATIENT SERVICES | Facility: HOSPITAL | Age: 58
LOS: 1 days | End: 2018-05-21
Payer: MEDICAID

## 2018-05-21 ENCOUNTER — APPOINTMENT (OUTPATIENT)
Dept: MRI IMAGING | Facility: IMAGING CENTER | Age: 58
End: 2018-05-21
Payer: MEDICAID

## 2018-05-21 DIAGNOSIS — L40.9 PSORIASIS, UNSPECIFIED: ICD-10-CM

## 2018-05-21 DIAGNOSIS — Z98.89 OTHER SPECIFIED POSTPROCEDURAL STATES: Chronic | ICD-10-CM

## 2018-05-21 DIAGNOSIS — I10 ESSENTIAL (PRIMARY) HYPERTENSION: ICD-10-CM

## 2018-05-21 DIAGNOSIS — C11.9 MALIGNANT NEOPLASM OF NASOPHARYNX, UNSPECIFIED: ICD-10-CM

## 2018-05-21 PROCEDURE — A9585: CPT

## 2018-05-21 PROCEDURE — 70543 MRI ORBT/FAC/NCK W/O &W/DYE: CPT | Mod: 26

## 2018-05-21 PROCEDURE — 70543 MRI ORBT/FAC/NCK W/O &W/DYE: CPT

## 2018-05-21 PROCEDURE — 71250 CT THORAX DX C-: CPT

## 2018-05-21 PROCEDURE — 71250 CT THORAX DX C-: CPT | Mod: 26

## 2018-06-01 ENCOUNTER — APPOINTMENT (OUTPATIENT)
Dept: DERMATOLOGY | Facility: CLINIC | Age: 58
End: 2018-06-01
Payer: MEDICAID

## 2018-06-01 VITALS — SYSTOLIC BLOOD PRESSURE: 138 MMHG | DIASTOLIC BLOOD PRESSURE: 94 MMHG

## 2018-06-01 PROCEDURE — 99213 OFFICE O/P EST LOW 20 MIN: CPT

## 2018-06-11 ENCOUNTER — APPOINTMENT (OUTPATIENT)
Dept: DERMATOLOGY | Facility: CLINIC | Age: 58
End: 2018-06-11

## 2018-06-13 ENCOUNTER — APPOINTMENT (OUTPATIENT)
Dept: DERMATOLOGY | Facility: CLINIC | Age: 58
End: 2018-06-13

## 2018-06-15 ENCOUNTER — APPOINTMENT (OUTPATIENT)
Dept: DERMATOLOGY | Facility: CLINIC | Age: 58
End: 2018-06-15

## 2018-06-18 ENCOUNTER — APPOINTMENT (OUTPATIENT)
Dept: DERMATOLOGY | Facility: CLINIC | Age: 58
End: 2018-06-18

## 2018-06-20 ENCOUNTER — APPOINTMENT (OUTPATIENT)
Dept: DERMATOLOGY | Facility: CLINIC | Age: 58
End: 2018-06-20
Payer: MEDICAID

## 2018-06-20 PROCEDURE — 96910 PHOTCHMTX TAR&UVB/PTRLTM&UVB: CPT

## 2018-06-22 ENCOUNTER — APPOINTMENT (OUTPATIENT)
Dept: DERMATOLOGY | Facility: CLINIC | Age: 58
End: 2018-06-22

## 2018-06-25 ENCOUNTER — APPOINTMENT (OUTPATIENT)
Dept: DERMATOLOGY | Facility: CLINIC | Age: 58
End: 2018-06-25
Payer: MEDICAID

## 2018-06-25 PROCEDURE — 96910 PHOTCHMTX TAR&UVB/PTRLTM&UVB: CPT

## 2018-06-27 ENCOUNTER — APPOINTMENT (OUTPATIENT)
Dept: DERMATOLOGY | Facility: CLINIC | Age: 58
End: 2018-06-27

## 2018-06-29 ENCOUNTER — APPOINTMENT (OUTPATIENT)
Dept: DERMATOLOGY | Facility: CLINIC | Age: 58
End: 2018-06-29

## 2018-07-31 ENCOUNTER — APPOINTMENT (OUTPATIENT)
Dept: OTOLARYNGOLOGY | Facility: CLINIC | Age: 58
End: 2018-07-31
Payer: MEDICAID

## 2018-07-31 VITALS
SYSTOLIC BLOOD PRESSURE: 149 MMHG | BODY MASS INDEX: 21.03 KG/M2 | DIASTOLIC BLOOD PRESSURE: 98 MMHG | HEART RATE: 75 BPM | WEIGHT: 115 LBS

## 2018-07-31 PROCEDURE — 31231 NASAL ENDOSCOPY DX: CPT

## 2018-07-31 PROCEDURE — 99214 OFFICE O/P EST MOD 30 MIN: CPT | Mod: 25

## 2018-11-09 PROBLEM — L40.9 PSORIASIS, UNSPECIFIED: Chronic | Status: ACTIVE | Noted: 2017-09-21

## 2018-11-22 ENCOUNTER — FORM ENCOUNTER (OUTPATIENT)
Age: 58
End: 2018-11-22

## 2018-11-23 ENCOUNTER — OUTPATIENT (OUTPATIENT)
Dept: OUTPATIENT SERVICES | Facility: HOSPITAL | Age: 58
LOS: 1 days | End: 2018-11-23
Payer: MEDICAID

## 2018-11-23 ENCOUNTER — APPOINTMENT (OUTPATIENT)
Dept: MRI IMAGING | Facility: IMAGING CENTER | Age: 58
End: 2018-11-23
Payer: MEDICAID

## 2018-11-23 ENCOUNTER — APPOINTMENT (OUTPATIENT)
Dept: CT IMAGING | Facility: IMAGING CENTER | Age: 58
End: 2018-11-23
Payer: MEDICAID

## 2018-11-23 DIAGNOSIS — C11.9 MALIGNANT NEOPLASM OF NASOPHARYNX, UNSPECIFIED: ICD-10-CM

## 2018-11-23 DIAGNOSIS — Z98.89 OTHER SPECIFIED POSTPROCEDURAL STATES: Chronic | ICD-10-CM

## 2018-11-23 PROCEDURE — 71250 CT THORAX DX C-: CPT | Mod: 26

## 2018-11-23 PROCEDURE — 82565 ASSAY OF CREATININE: CPT

## 2018-11-23 PROCEDURE — 70543 MRI ORBT/FAC/NCK W/O &W/DYE: CPT

## 2018-11-23 PROCEDURE — 70543 MRI ORBT/FAC/NCK W/O &W/DYE: CPT | Mod: 26

## 2018-11-23 PROCEDURE — A9585: CPT

## 2018-11-23 PROCEDURE — 71250 CT THORAX DX C-: CPT

## 2018-12-18 ENCOUNTER — APPOINTMENT (OUTPATIENT)
Dept: OTOLARYNGOLOGY | Facility: CLINIC | Age: 58
End: 2018-12-18

## 2019-01-15 ENCOUNTER — APPOINTMENT (OUTPATIENT)
Dept: OTOLARYNGOLOGY | Facility: CLINIC | Age: 59
End: 2019-01-15
Payer: MEDICAID

## 2019-01-15 VITALS
SYSTOLIC BLOOD PRESSURE: 159 MMHG | HEIGHT: 62 IN | HEART RATE: 80 BPM | BODY MASS INDEX: 20.98 KG/M2 | WEIGHT: 114 LBS | DIASTOLIC BLOOD PRESSURE: 103 MMHG

## 2019-01-15 PROCEDURE — 31231 NASAL ENDOSCOPY DX: CPT

## 2019-01-15 PROCEDURE — 99214 OFFICE O/P EST MOD 30 MIN: CPT | Mod: 25

## 2019-01-15 RX ORDER — MELOXICAM 7.5 MG/1
7.5 TABLET ORAL
Qty: 30 | Refills: 0 | Status: DISCONTINUED | COMMUNITY
Start: 2018-02-03 | End: 2019-01-15

## 2019-01-15 RX ORDER — AMLODIPINE BESYLATE 5 MG/1
5 TABLET ORAL
Qty: 30 | Refills: 0 | Status: DISCONTINUED | COMMUNITY
Start: 2018-02-02 | End: 2019-01-15

## 2019-01-15 RX ORDER — BENZONATATE 100 MG/1
100 CAPSULE ORAL
Qty: 30 | Refills: 0 | Status: DISCONTINUED | COMMUNITY
Start: 2018-03-14 | End: 2019-01-15

## 2019-01-15 RX ORDER — CHOLECALCIFEROL (VITAMIN D3) 1250 MCG
1.25 MG CAPSULE ORAL
Qty: 4 | Refills: 0 | Status: COMPLETED | COMMUNITY
Start: 2018-04-16 | End: 2019-01-15

## 2019-01-15 RX ORDER — MULTIVIT,TX WITH IRON,MINERALS
TABLET ORAL
Qty: 30 | Refills: 0 | Status: COMPLETED | COMMUNITY
Start: 2018-03-14 | End: 2019-01-15

## 2019-01-15 RX ORDER — NITROFURANTOIN (MONOHYDRATE/MACROCRYSTALS) 25; 75 MG/1; MG/1
100 CAPSULE ORAL
Qty: 14 | Refills: 0 | Status: DISCONTINUED | COMMUNITY
Start: 2018-05-08 | End: 2019-01-15

## 2019-01-15 RX ORDER — OFLOXACIN OTIC 3 MG/ML
0.3 SOLUTION AURICULAR (OTIC) TWICE DAILY
Qty: 1 | Refills: 0 | Status: COMPLETED | COMMUNITY
Start: 2017-12-18 | End: 2019-01-15

## 2019-01-15 RX ORDER — CALCIPOTRIENE 50 UG/G
0.01 OINTMENT TOPICAL TWICE DAILY
Qty: 1 | Refills: 3 | Status: COMPLETED | COMMUNITY
Start: 2018-06-01 | End: 2019-01-15

## 2019-01-15 RX ORDER — CIPROFLOXACIN HYDROCHLORIDE 250 MG/1
250 TABLET, FILM COATED ORAL
Qty: 20 | Refills: 0 | Status: DISCONTINUED | COMMUNITY
Start: 2018-03-14 | End: 2019-01-15

## 2019-01-15 RX ORDER — OMEPRAZOLE 40 MG/1
40 CAPSULE, DELAYED RELEASE ORAL
Qty: 30 | Refills: 0 | Status: DISCONTINUED | COMMUNITY
Start: 2018-01-30 | End: 2019-01-15

## 2019-01-15 RX ORDER — ASPIRIN 81 MG/1
81 TABLET, CHEWABLE ORAL
Qty: 30 | Refills: 0 | Status: DISCONTINUED | COMMUNITY
Start: 2018-04-09 | End: 2019-01-15

## 2019-01-15 RX ORDER — LOSARTAN POTASSIUM 100 MG/1
100 TABLET, FILM COATED ORAL
Qty: 30 | Refills: 0 | Status: DISCONTINUED | COMMUNITY
Start: 2018-07-23 | End: 2019-01-15

## 2019-01-15 RX ORDER — OFLOXACIN 3 MG/ML
0.3 SOLUTION/ DROPS OPHTHALMIC TWICE DAILY
Qty: 5 | Refills: 0 | Status: COMPLETED | COMMUNITY
Start: 2017-12-18 | End: 2019-01-15

## 2019-01-15 RX ORDER — HYDROCORTISONE 2.5% 25 MG/G
2.5 CREAM TOPICAL
Qty: 60 | Refills: 0 | Status: DISCONTINUED | COMMUNITY
Start: 2018-01-02 | End: 2019-01-15

## 2019-01-15 RX ORDER — ATENOLOL 50 MG/1
50 TABLET ORAL
Qty: 30 | Refills: 0 | Status: DISCONTINUED | COMMUNITY
Start: 2018-01-02 | End: 2019-01-15

## 2019-01-15 RX ORDER — ERGOCALCIFEROL 1.25 MG/1
1.25 MG CAPSULE, LIQUID FILLED ORAL
Qty: 4 | Refills: 0 | Status: DISCONTINUED | COMMUNITY
Start: 2018-06-13 | End: 2019-01-15

## 2019-01-15 RX ORDER — TACROLIMUS 0.3 MG/G
0.03 OINTMENT TOPICAL
Qty: 1 | Refills: 1 | Status: DISCONTINUED | COMMUNITY
Start: 2018-06-13 | End: 2019-01-15

## 2019-01-15 RX ORDER — LORATADINE AND PSEUDOEPHEDRINE 10; 240 MG/1; MG/1
10-240 TABLET, EXTENDED RELEASE ORAL
Qty: 30 | Refills: 0 | Status: COMPLETED | COMMUNITY
Start: 2018-03-14 | End: 2019-01-15

## 2019-01-15 RX ORDER — TOBRAMYCIN 3 MG/ML
0.3 SOLUTION/ DROPS OPHTHALMIC
Qty: 5 | Refills: 0 | Status: DISCONTINUED | COMMUNITY
Start: 2018-07-24 | End: 2019-01-15

## 2019-01-15 RX ORDER — ASPIRIN ENTERIC COATED TABLETS 81 MG 81 MG/1
81 TABLET, DELAYED RELEASE ORAL
Qty: 30 | Refills: 0 | Status: DISCONTINUED | COMMUNITY
Start: 2018-05-08 | End: 2019-01-15

## 2019-01-15 RX ORDER — AZITHROMYCIN 250 MG/1
250 TABLET, FILM COATED ORAL
Qty: 6 | Refills: 0 | Status: DISCONTINUED | COMMUNITY
Start: 2018-03-14 | End: 2019-01-15

## 2019-01-15 RX ORDER — ISOPROPYL ALCOHOL 70 ML/100ML
70 SWAB TOPICAL
Qty: 100 | Refills: 0 | Status: COMPLETED | COMMUNITY
Start: 2018-01-02 | End: 2019-01-15

## 2019-05-27 ENCOUNTER — FORM ENCOUNTER (OUTPATIENT)
Age: 59
End: 2019-05-27

## 2019-05-28 ENCOUNTER — APPOINTMENT (OUTPATIENT)
Dept: MRI IMAGING | Facility: IMAGING CENTER | Age: 59
End: 2019-05-28
Payer: MEDICAID

## 2019-05-28 ENCOUNTER — OUTPATIENT (OUTPATIENT)
Dept: OUTPATIENT SERVICES | Facility: HOSPITAL | Age: 59
LOS: 1 days | End: 2019-05-28
Payer: MEDICAID

## 2019-05-28 ENCOUNTER — APPOINTMENT (OUTPATIENT)
Dept: CT IMAGING | Facility: IMAGING CENTER | Age: 59
End: 2019-05-28
Payer: MEDICAID

## 2019-05-28 DIAGNOSIS — C11.9 MALIGNANT NEOPLASM OF NASOPHARYNX, UNSPECIFIED: ICD-10-CM

## 2019-05-28 DIAGNOSIS — Z98.89 OTHER SPECIFIED POSTPROCEDURAL STATES: Chronic | ICD-10-CM

## 2019-05-28 PROCEDURE — 71250 CT THORAX DX C-: CPT | Mod: 26

## 2019-05-28 PROCEDURE — A9585: CPT

## 2019-05-28 PROCEDURE — 71250 CT THORAX DX C-: CPT

## 2019-05-28 PROCEDURE — 70543 MRI ORBT/FAC/NCK W/O &W/DYE: CPT | Mod: 26

## 2019-05-28 PROCEDURE — 70543 MRI ORBT/FAC/NCK W/O &W/DYE: CPT

## 2019-06-17 ENCOUNTER — LABORATORY RESULT (OUTPATIENT)
Age: 59
End: 2019-06-17

## 2019-06-17 ENCOUNTER — APPOINTMENT (OUTPATIENT)
Dept: OTOLARYNGOLOGY | Facility: CLINIC | Age: 59
End: 2019-06-17
Payer: MEDICAID

## 2019-06-17 VITALS
HEIGHT: 62 IN | BODY MASS INDEX: 20.98 KG/M2 | WEIGHT: 114 LBS | SYSTOLIC BLOOD PRESSURE: 169 MMHG | DIASTOLIC BLOOD PRESSURE: 100 MMHG | HEART RATE: 77 BPM

## 2019-06-17 PROCEDURE — 31231 NASAL ENDOSCOPY DX: CPT

## 2019-06-17 PROCEDURE — 10005 FNA BX W/US GDN 1ST LES: CPT

## 2019-06-17 PROCEDURE — 99214 OFFICE O/P EST MOD 30 MIN: CPT | Mod: 25

## 2019-06-17 NOTE — DATA REVIEWED
[de-identified] : US today with complex, mostly cystic but with a small solid component, thyroid nodule in the isthmus and left paraisthmic thyroid gland measuring 1.73 x 0.82 x 1.93 cm.  No obvious LAD. [de-identified] : MRI Neck and CT Chest with LUIS DANIEL.  MRI Neck reports a complex isthmic thyroid nodule increasing in size.

## 2019-06-17 NOTE — PHYSICAL EXAM
[de-identified] : Posttreatment changes, no obvious LAD. No TTP. [de-identified] : Psoriactic changes along the conchal bowl AS.  EAC AD with crusted cerumen impaction - difficult to clear under the microscope. [Nasal Endoscopy Performed] : nasal endoscopy was performed, see procedure section for findings [Midline] : trachea located in midline position [Normal] : no rashes [de-identified] : Slight weakness of the marginal nerve on the right.

## 2019-06-17 NOTE — PROCEDURE
[FreeTextEntry1] : US FNA thyroid nodule [FreeTextEntry2] : Thyroid nodule increasing in size [FreeTextEntry3] : After patient gave consent, the nodule was visualized with US with findings noted above.  The overlying skin was prepped with alcohol.  Under US guidance, a FNA was performed with multiple passes of a 22 gauge needle.  Cystic fluid was encountered.  The specimen was sent to Cytology.  No hematoma.  Patient tolerated the procedure well. [Flexible Endoscope] : examined with the flexible endoscope [None] : none [FreeTextEntry6] : No lesions in the NC or NPx. Posttreatment changes are noted.  No evidence of purulence from the OMCs.  [de-identified] : Recurrent NPC [Serial Number: ___] : Serial Number: [unfilled]

## 2019-06-17 NOTE — HISTORY OF PRESENT ILLNESS
[de-identified] : 58F with history of NPx in 2011 and now s/p revision right parotidectomy 10/2017 presents for follow up.  Pt had imaging in May, 2019.  Pt denies dysphonia, dysphagia, pain, SOB, otalgia.  Her weight is stable.  She reports noticing a new lump in her central neck over the past month or so.  She denies any pain.  She feels that it has been stable since when she first noticed it.

## 2019-06-26 ENCOUNTER — RESULT REVIEW (OUTPATIENT)
Age: 59
End: 2019-06-26

## 2019-06-26 ENCOUNTER — APPOINTMENT (OUTPATIENT)
Dept: ULTRASOUND IMAGING | Facility: IMAGING CENTER | Age: 59
End: 2019-06-26
Payer: MEDICAID

## 2019-06-26 ENCOUNTER — OUTPATIENT (OUTPATIENT)
Dept: OUTPATIENT SERVICES | Facility: HOSPITAL | Age: 59
LOS: 1 days | End: 2019-06-26
Payer: MEDICAID

## 2019-06-26 DIAGNOSIS — E04.1 NONTOXIC SINGLE THYROID NODULE: ICD-10-CM

## 2019-06-26 DIAGNOSIS — Z98.89 OTHER SPECIFIED POSTPROCEDURAL STATES: Chronic | ICD-10-CM

## 2019-06-26 PROCEDURE — 10005 FNA BX W/US GDN 1ST LES: CPT

## 2019-06-26 PROCEDURE — 88173 CYTOPATH EVAL FNA REPORT: CPT

## 2019-06-26 PROCEDURE — 88173 CYTOPATH EVAL FNA REPORT: CPT | Mod: 26

## 2019-06-26 PROCEDURE — 36415 COLL VENOUS BLD VENIPUNCTURE: CPT

## 2019-06-26 PROCEDURE — 88172 CYTP DX EVAL FNA 1ST EA SITE: CPT

## 2019-06-27 LAB — NON-GYNECOLOGICAL CYTOLOGY STUDY: SIGNIFICANT CHANGE UP

## 2019-07-03 ENCOUNTER — RESULT REVIEW (OUTPATIENT)
Age: 59
End: 2019-07-03

## 2019-07-12 ENCOUNTER — TRANSCRIPTION ENCOUNTER (OUTPATIENT)
Age: 59
End: 2019-07-12

## 2019-09-02 PROBLEM — Z09 FOLLOW UP: Status: ACTIVE | Noted: 2017-12-04

## 2019-09-02 PROBLEM — Z09 FOLLOW UP: Status: RESOLVED | Noted: 2017-11-06 | Resolved: 2019-09-02

## 2019-09-30 ENCOUNTER — APPOINTMENT (OUTPATIENT)
Dept: OTOLARYNGOLOGY | Facility: CLINIC | Age: 59
End: 2019-09-30
Payer: MEDICAID

## 2019-09-30 VITALS
HEART RATE: 81 BPM | BODY MASS INDEX: 20.98 KG/M2 | DIASTOLIC BLOOD PRESSURE: 100 MMHG | HEIGHT: 62 IN | SYSTOLIC BLOOD PRESSURE: 179 MMHG | WEIGHT: 114 LBS

## 2019-09-30 PROCEDURE — 99214 OFFICE O/P EST MOD 30 MIN: CPT | Mod: 25

## 2019-09-30 PROCEDURE — 31231 NASAL ENDOSCOPY DX: CPT

## 2019-09-30 RX ORDER — MULTIVITAMIN
TABLET ORAL
Refills: 0 | Status: COMPLETED | COMMUNITY
End: 2019-09-30

## 2019-09-30 NOTE — REASON FOR VISIT
[Subsequent Evaluation] : a subsequent evaluation for [FreeTextEntry2] : f/up for NPX and s/p parotidectomy

## 2019-09-30 NOTE — HISTORY OF PRESENT ILLNESS
[de-identified] : 60 y/o F with history of NPx in 2011 and s/p revision right parotidectomy 10/2017 presents for follow up. \par  Pt had a Left Isthmus FNA on 6/26/19 which was sent for Thyroseq and was negative.  Pt states she feels well. No other recent imaging.  She denies any dyspnea, dysphagia, otalgia, dysphonia or weight loss.  She also denies any hearing loss or epistaxis.  She intermittently feels some tenderness overlying the area of the previous parotidectomy but its not severe and resolves.\par

## 2019-09-30 NOTE — PHYSICAL EXAM
[Nasal Endoscopy Performed] : nasal endoscopy was performed, see procedure section for findings [Midline] : trachea located in midline position [Normal] : no rashes [de-identified] : Posttreatment changes, no obvious LAD. No TTP. [de-identified] : Psoriactic changes along the conchal bowl AS.  EAC AD with crusted cerumen impaction - difficult to clear under the microscope. [de-identified] : Slight weakness of the marginal nerve on the right.

## 2019-09-30 NOTE — DATA REVIEWED
[de-identified] : US today with complex thyroid nodule in the isthmus and left paraisthmic thyroid gland measuring 1.04 x 0.84 x 1.01 cm - smaller than last visit.  No obvious LAD.

## 2019-09-30 NOTE — PROCEDURE
[None] : none [Flexible Endoscope] : examined with the flexible endoscope [Normal] : the middle meatus had no abnormalities [Serial Number: ___] : Serial Number: [unfilled] [de-identified] : NPC [FreeTextEntry6] : No lesions in the NC or NPx. Posttreatment changes are noted. No evidence of purulence from the OMCs. \par

## 2019-12-04 ENCOUNTER — FORM ENCOUNTER (OUTPATIENT)
Age: 59
End: 2019-12-04

## 2019-12-05 ENCOUNTER — APPOINTMENT (OUTPATIENT)
Dept: CT IMAGING | Facility: IMAGING CENTER | Age: 59
End: 2019-12-05
Payer: MEDICAID

## 2019-12-05 ENCOUNTER — APPOINTMENT (OUTPATIENT)
Dept: MRI IMAGING | Facility: IMAGING CENTER | Age: 59
End: 2019-12-05
Payer: MEDICAID

## 2019-12-05 ENCOUNTER — OUTPATIENT (OUTPATIENT)
Dept: OUTPATIENT SERVICES | Facility: HOSPITAL | Age: 59
LOS: 1 days | End: 2019-12-05
Payer: MEDICAID

## 2019-12-05 DIAGNOSIS — Z98.89 OTHER SPECIFIED POSTPROCEDURAL STATES: Chronic | ICD-10-CM

## 2019-12-05 DIAGNOSIS — C11.9 MALIGNANT NEOPLASM OF NASOPHARYNX, UNSPECIFIED: ICD-10-CM

## 2019-12-05 PROCEDURE — 70542 MRI ORBIT/FACE/NECK W/DYE: CPT | Mod: 26

## 2019-12-05 PROCEDURE — 70542 MRI ORBIT/FACE/NECK W/DYE: CPT

## 2019-12-05 PROCEDURE — A9585: CPT

## 2019-12-05 PROCEDURE — 71260 CT THORAX DX C+: CPT

## 2019-12-05 PROCEDURE — 71260 CT THORAX DX C+: CPT | Mod: 26

## 2020-01-17 ENCOUNTER — APPOINTMENT (OUTPATIENT)
Dept: OTOLARYNGOLOGY | Facility: CLINIC | Age: 60
End: 2020-01-17
Payer: MEDICAID

## 2020-01-17 VITALS
WEIGHT: 114 LBS | HEIGHT: 62 IN | SYSTOLIC BLOOD PRESSURE: 146 MMHG | BODY MASS INDEX: 20.98 KG/M2 | DIASTOLIC BLOOD PRESSURE: 91 MMHG | HEART RATE: 91 BPM

## 2020-01-17 PROCEDURE — 31575 DIAGNOSTIC LARYNGOSCOPY: CPT

## 2020-01-17 PROCEDURE — 99214 OFFICE O/P EST MOD 30 MIN: CPT | Mod: 25

## 2020-01-17 NOTE — DATA REVIEWED
[de-identified] : US today with stable findings in the thyroid. [de-identified] : MRI Neck and CT Chest with LUIS DANIEL on December 5, 2019.

## 2020-01-17 NOTE — PROCEDURE
[None] : none [Flexible Endoscope] : examined with the flexible endoscope [Normal] : the middle meatus had no abnormalities [Serial Number: ___] : Serial Number: [unfilled] [FreeTextEntry6] : No lesions in the NC or NPx. Posttreatment changes are noted. No evidence of purulence from the OMCs.  [de-identified] : NPC

## 2020-01-17 NOTE — PHYSICAL EXAM
[Nasal Endoscopy Performed] : nasal endoscopy was performed, see procedure section for findings [Midline] : trachea located in midline position [Normal] : no rashes [de-identified] : Psoriactic changes along the conchal bowl AS.  EAC AD with crusted cerumen impaction - difficult to clear under the microscope. [de-identified] : Posttreatment changes, no obvious LAD. No TTP. [de-identified] : Slight weakness of the marginal nerve on the right.

## 2020-01-17 NOTE — HISTORY OF PRESENT ILLNESS
[de-identified] : 58 y/o F with history of NPx in 2011 and s/p revision right parotidectomy 10/2017 presents for follow up.  On December 18, 2019 pt had a stroke and she has L arm weakness.  The doctors attributed it to HTN and lack of sleep. She is in PT.  Otherwise the patient feels well.  Pt is followed by Dr. Randi Alexander as PCP.  Pt is not followed by a neurologist.   She denies any dyspnea, dysphagia, otalgia, dysphonia or weight loss. She also denies any hearing loss or epistaxis.

## 2020-06-02 ENCOUNTER — APPOINTMENT (OUTPATIENT)
Dept: OTOLARYNGOLOGY | Facility: CLINIC | Age: 60
End: 2020-06-02
Payer: MEDICAID

## 2020-06-02 VITALS
SYSTOLIC BLOOD PRESSURE: 142 MMHG | HEIGHT: 62 IN | DIASTOLIC BLOOD PRESSURE: 92 MMHG | HEART RATE: 82 BPM | WEIGHT: 114 LBS | BODY MASS INDEX: 20.98 KG/M2

## 2020-06-02 PROCEDURE — 99214 OFFICE O/P EST MOD 30 MIN: CPT | Mod: 25

## 2020-06-02 PROCEDURE — 31575 DIAGNOSTIC LARYNGOSCOPY: CPT

## 2020-06-02 NOTE — HISTORY OF PRESENT ILLNESS
[de-identified] : 58 y/o F with history of NPx in 2011 and s/p revision right parotidectomy 10/2017 presents for follow up. On December 18, 2019 pt had a stroke and she has L arm weakness. The doctors attributed it to HTN and lack of sleep.  Pt is followed by Catherine Romero as PCP. Pt is not followed by a neurologist. Pt c/o insomnia from the arm pain. Pt c/o R ear pain and tinnitus for the last three weeks.  PT is eating without pain.  She is eating a regular diet.  She denies any dyspnea, dysphonia, weight loss.

## 2020-06-02 NOTE — PROCEDURE
[None] : none [Serial Number: ___] : Serial Number: [unfilled] [Flexible Endoscope] : examined with the flexible endoscope [FreeTextEntry6] : No lesions in the NC or NPx. Posttreatment changes are noted. No evidence of purulence from the OMCs.  [Normal] : the middle meatus had no abnormalities [de-identified] : NPC

## 2020-06-02 NOTE — PHYSICAL EXAM
[de-identified] : Posttreatment changes, no obvious LAD. No TTP. [de-identified] : Psoriactic changes along the conchal bowl AS.  EAC AD with erythema and medialized cerumen but not impacted, no drainage, no TTP. [Nasal Endoscopy Performed] : nasal endoscopy was performed, see procedure section for findings [Midline] : trachea located in midline position [de-identified] : Slight weakness of the marginal nerve on the right. [Normal] : no rashes

## 2020-06-22 ENCOUNTER — APPOINTMENT (OUTPATIENT)
Dept: PHYSICAL MEDICINE AND REHAB | Facility: CLINIC | Age: 60
End: 2020-06-22

## 2020-06-23 ENCOUNTER — APPOINTMENT (OUTPATIENT)
Dept: PHYSICAL MEDICINE AND REHAB | Facility: CLINIC | Age: 60
End: 2020-06-23
Payer: MEDICAID

## 2020-06-23 VITALS
OXYGEN SATURATION: 97 % | DIASTOLIC BLOOD PRESSURE: 104 MMHG | HEART RATE: 85 BPM | TEMPERATURE: 98.7 F | SYSTOLIC BLOOD PRESSURE: 155 MMHG

## 2020-06-23 DIAGNOSIS — M25.512 PAIN IN LEFT SHOULDER: ICD-10-CM

## 2020-06-23 DIAGNOSIS — Z86.73 PERSONAL HISTORY OF TRANSIENT ISCHEMIC ATTACK (TIA), AND CEREBRAL INFARCTION W/OUT RESIDUAL DEFICITS: ICD-10-CM

## 2020-06-23 DIAGNOSIS — D49.0 NEOPLASM OF UNSPECIFIED BEHAVIOR OF DIGESTIVE SYSTEM: ICD-10-CM

## 2020-06-23 PROCEDURE — 99203 OFFICE O/P NEW LOW 30 MIN: CPT

## 2020-06-23 NOTE — REVIEW OF SYSTEMS
[Joint Pain] : joint pain [Muscle Pain] : muscle pain [Negative] : Heme/Lymph [de-identified] : weakness

## 2020-06-23 NOTE — PHYSICAL EXAM
[FreeTextEntry1] : GEN: AAOx3, NAD.\par PSYCH: Normal mood and affect. Responds appropriately to commands.\par EYES: Sclerae Anicteric. No discharge. EOMI.\par RESP: Breathing unlabored.\par CV: Radial pulses 2+ and equal. No varicosities noted.\par EXT: No C/C/E.\par SKIN: No lesions noted. \par GAIT: Non antalgic, normal reciprocating heel to toe.\par INSP: No Atrophy, Bony Deformity, Swelling. Shoulder height symmetric. Normal Thoracic Kyphosis.\par Cervical ROM: Full and pain free.\par Shoulder AROM: Full and pain free on the right. left shoulder ROM limited to 75 degrees of flexion and adduction\par PALP: + R shoulder nontender, + left shoulder tender anteriorly and at deltoid.  no subluxation appreciated.\par Elbow: + right elbow tenderness lateral aspect\par no erythema or swelling noted in either extremity\par STRENGTH: 5/5 b/l LE, upper extremity strength limited by pain\par SENSATION:  Normal to light touch in the bilateral upper extremities.\par REFLEXES: 2+ R brachioradialis,  3+ left brachioradialis\par SPECIAL: unable to perform due to pain\par

## 2020-06-23 NOTE — ASSESSMENT
[FreeTextEntry1] : 60 yo F hx of nasopharyx ca, parotid ca treated in 2017, presenting for R elbow pain and left shoulder pain, s/p cva in December 2019.\par -R elbow pain likely lateral epicondylits\par - Pt states she is having routine surveilance imaging soon- MRI l shoulder ordered to evaluate for L rtc\par -lidocaine patch ordered for left upper extremity.  possible adhesive capsulits vs myofascial, pt reports improvement with massage, also plans to do accupuncture.\par - start PT \par -rx for meloxicam sent to patient's pharmacy, patient states she was taking advil but it was not strong enough

## 2020-06-23 NOTE — HISTORY OF PRESENT ILLNESS
[FreeTextEntry1] : 59 year old female pmh of R parotidectomy in 2017 s/p upper neck dissection.  Per patient, she had   CVA in Dec 2019 and since then has had LUE weakness and pain.  Pt states the weakness is mostly due to her pain. Patient is right hand dominant but states she has been using her RUE more than normal due to her left sided pain.  She presents today for R elbow pain and L shoulder pain.  Patient takes advil at times, states it helps only minimally. She states pain keeps her up at night.  Pt has tried lidocaine patch in the past during her cancer treatment, which helped her pain.  Patient states she is due for repeat imaging per oncology.  
No

## 2020-07-02 ENCOUNTER — RESULT REVIEW (OUTPATIENT)
Age: 60
End: 2020-07-02

## 2020-07-02 ENCOUNTER — APPOINTMENT (OUTPATIENT)
Dept: CT IMAGING | Facility: IMAGING CENTER | Age: 60
End: 2020-07-02
Payer: MEDICAID

## 2020-07-02 ENCOUNTER — OUTPATIENT (OUTPATIENT)
Dept: OUTPATIENT SERVICES | Facility: HOSPITAL | Age: 60
LOS: 1 days | End: 2020-07-02
Payer: MEDICAID

## 2020-07-02 ENCOUNTER — APPOINTMENT (OUTPATIENT)
Dept: MRI IMAGING | Facility: IMAGING CENTER | Age: 60
End: 2020-07-02
Payer: MEDICAID

## 2020-07-02 DIAGNOSIS — Z98.89 OTHER SPECIFIED POSTPROCEDURAL STATES: Chronic | ICD-10-CM

## 2020-07-02 DIAGNOSIS — Z00.8 ENCOUNTER FOR OTHER GENERAL EXAMINATION: ICD-10-CM

## 2020-07-02 PROCEDURE — 70542 MRI ORBIT/FACE/NECK W/DYE: CPT | Mod: 26

## 2020-07-02 PROCEDURE — 70542 MRI ORBIT/FACE/NECK W/DYE: CPT

## 2020-07-02 PROCEDURE — A9585: CPT

## 2020-07-02 PROCEDURE — 71260 CT THORAX DX C+: CPT | Mod: 26

## 2020-07-02 PROCEDURE — 71260 CT THORAX DX C+: CPT

## 2020-07-02 PROCEDURE — 82565 ASSAY OF CREATININE: CPT

## 2020-07-21 ENCOUNTER — APPOINTMENT (OUTPATIENT)
Dept: MRI IMAGING | Facility: IMAGING CENTER | Age: 60
End: 2020-07-21

## 2020-09-14 ENCOUNTER — APPOINTMENT (OUTPATIENT)
Dept: OTOLARYNGOLOGY | Facility: CLINIC | Age: 60
End: 2020-09-14

## 2020-10-05 ENCOUNTER — APPOINTMENT (OUTPATIENT)
Dept: OTOLARYNGOLOGY | Facility: CLINIC | Age: 60
End: 2020-10-05
Payer: MEDICAID

## 2020-10-05 VITALS
HEIGHT: 62 IN | DIASTOLIC BLOOD PRESSURE: 107 MMHG | SYSTOLIC BLOOD PRESSURE: 142 MMHG | HEART RATE: 98 BPM | WEIGHT: 114 LBS | BODY MASS INDEX: 20.98 KG/M2

## 2020-10-05 PROCEDURE — 99214 OFFICE O/P EST MOD 30 MIN: CPT | Mod: 25

## 2020-10-05 PROCEDURE — 31231 NASAL ENDOSCOPY DX: CPT

## 2020-10-05 NOTE — PROCEDURE
[None] : none [Flexible Endoscope] : examined with the flexible endoscope [Normal] : the middle meatus had no abnormalities [Serial Number: ___] : Serial Number: [unfilled] [FreeTextEntry6] : No lesions in the NC or NPx. Posttreatment changes are noted. No evidence of purulence from the OMCs.  [de-identified] : NPC

## 2020-10-05 NOTE — PHYSICAL EXAM
[Nasal Endoscopy Performed] : nasal endoscopy was performed, see procedure section for findings [Midline] : trachea located in midline position [Normal] : no rashes [de-identified] : Posttreatment changes, no obvious LAD. [de-identified] : Psoriactic changes along the conchal bowl AS.  EAC AD with erythema and medialized cerumen but not impacted, no drainage, no TTP. [de-identified] : Slight weakness of the marginal nerve on the right.

## 2020-10-05 NOTE — HISTORY OF PRESENT ILLNESS
[de-identified] : 60 y/o F with history of NPx in 2011 and s/p revision right parotidectomy 10/2017 presents for follow up. last imaing in 7/2020 LUIS DANIEL.  On December 18, 2019 pt had a stroke and she has L arm weakness is much better ( working with PT) and followed by Catherine Romero as PCP. Pt is not followed by a neurologist. Pt c/o R ear pain and tinnitus with ear tube since 2012.   She is eating a regular diet.  She denies any dyspnea, dysphonia, weight loss.

## 2021-04-05 ENCOUNTER — APPOINTMENT (OUTPATIENT)
Dept: OTOLARYNGOLOGY | Facility: CLINIC | Age: 61
End: 2021-04-05
Payer: MEDICAID

## 2021-04-05 VITALS
DIASTOLIC BLOOD PRESSURE: 94 MMHG | BODY MASS INDEX: 20.98 KG/M2 | HEART RATE: 80 BPM | TEMPERATURE: 98 F | SYSTOLIC BLOOD PRESSURE: 148 MMHG | HEIGHT: 62 IN | WEIGHT: 114 LBS

## 2021-04-05 PROCEDURE — 99072 ADDL SUPL MATRL&STAF TM PHE: CPT

## 2021-04-05 PROCEDURE — 99214 OFFICE O/P EST MOD 30 MIN: CPT | Mod: 25

## 2021-04-05 PROCEDURE — 31231 NASAL ENDOSCOPY DX: CPT

## 2021-04-05 NOTE — DATA REVIEWED
[de-identified] : US with essentially a multinodular thyroid gland with multiple subcentimeter nodules in both lobes.

## 2021-04-05 NOTE — PHYSICAL EXAM
[Nasal Endoscopy Performed] : nasal endoscopy was performed, see procedure section for findings [Midline] : trachea located in midline position [Normal] : no rashes [de-identified] : Posttreatment changes, no obvious LAD. [de-identified] : Psoriactic changes along the conchal bowl AS.  [de-identified] : Slight weakness of the marginal nerve on the right.

## 2021-04-05 NOTE — HISTORY OF PRESENT ILLNESS
[de-identified] : 61 y/o F with history of NPx in 2011 and s/p revision right parotidectomy 10/2017 presents for follow up. last imaging in 7/2020 [facility].  On December 18, 2019 pt had a stroke and she has L arm weakness is much better ( working with PT) and followed by Catherine Romero as PCP. Pt is not followed by a neurologist. Pt states R ear pain and tinnitus with ear tube resolved.  She is eating a regular diet.  She denies any dyspnea, dysphonia, weight loss.

## 2021-04-05 NOTE — PROCEDURE
[None] : none [Flexible Endoscope] : examined with the flexible endoscope [Serial Number: ___] : Serial Number: [unfilled] [FreeTextEntry6] : No lesions in the NC or NPx. Posttreatment changes are noted. No evidence of purulence from the OMCs. [de-identified] : NPC

## 2021-06-06 NOTE — H&P PST ADULT - NS PRO LACT YNNA
no
Patient's chart was referred to charge nurse/supervisor for disposition of prescription and/or discharge instructions./Physician notified

## 2021-07-25 ENCOUNTER — APPOINTMENT (OUTPATIENT)
Dept: MRI IMAGING | Facility: IMAGING CENTER | Age: 61
End: 2021-07-25
Payer: MEDICAID

## 2021-07-25 ENCOUNTER — APPOINTMENT (OUTPATIENT)
Dept: CT IMAGING | Facility: IMAGING CENTER | Age: 61
End: 2021-07-25
Payer: MEDICAID

## 2021-07-25 ENCOUNTER — OUTPATIENT (OUTPATIENT)
Dept: OUTPATIENT SERVICES | Facility: HOSPITAL | Age: 61
LOS: 1 days | End: 2021-07-25
Payer: MEDICAID

## 2021-07-25 DIAGNOSIS — Z00.8 ENCOUNTER FOR OTHER GENERAL EXAMINATION: ICD-10-CM

## 2021-07-25 DIAGNOSIS — Z98.89 OTHER SPECIFIED POSTPROCEDURAL STATES: Chronic | ICD-10-CM

## 2021-07-25 PROCEDURE — 70542 MRI ORBIT/FACE/NECK W/DYE: CPT | Mod: 26

## 2021-07-25 PROCEDURE — 71250 CT THORAX DX C-: CPT | Mod: 26

## 2021-07-25 PROCEDURE — 70542 MRI ORBIT/FACE/NECK W/DYE: CPT

## 2021-07-25 PROCEDURE — 71250 CT THORAX DX C-: CPT

## 2021-10-04 ENCOUNTER — APPOINTMENT (OUTPATIENT)
Dept: OTOLARYNGOLOGY | Facility: CLINIC | Age: 61
End: 2021-10-04
Payer: MEDICAID

## 2021-10-04 VITALS
SYSTOLIC BLOOD PRESSURE: 122 MMHG | BODY MASS INDEX: 20.73 KG/M2 | HEART RATE: 76 BPM | HEIGHT: 63 IN | WEIGHT: 117 LBS | DIASTOLIC BLOOD PRESSURE: 81 MMHG

## 2021-10-04 PROCEDURE — 99214 OFFICE O/P EST MOD 30 MIN: CPT | Mod: 25

## 2021-10-04 PROCEDURE — 31231 NASAL ENDOSCOPY DX: CPT

## 2021-10-04 NOTE — DATA REVIEWED
[de-identified] : US with essentially a multinodular thyroid gland with multiple subcentimeter nodules in both lobes.   Exam of the right parotid with hypoechoic lesion measuring 1.01 x 0.97 cm.  No obvious LAD.

## 2021-10-04 NOTE — PROCEDURE
[None] : none [Flexible Endoscope] : examined with the flexible endoscope [Serial Number: ___] : Serial Number: [unfilled] [FreeTextEntry6] : No lesions in the NC or NPx. Posttreatment changes are noted. No evidence of purulence from the OMCs.  [de-identified] : NPC

## 2021-10-04 NOTE — HISTORY OF PRESENT ILLNESS
[de-identified] : 62 y/o F with history of NPx in 2011 and s/p revision right parotidectomy 10/2017 presents for follow up. On December 18, 2019 pt had a stroke and she has L arm weakness is much better ( working with PT) and followed by Catherine Romero as PCP. Pt is not followed by a neurologist.  She is eating a regular diet.  She denies any dyspnea, dysphonia, weight loss.

## 2021-10-04 NOTE — ADDENDUM
[FreeTextEntry1] : Documented by Ariella Aguayo acting as scribe for Dr. Hernandez on 10/04/2021 \par \par All Medical record entries made by the Scribe were at my, Dr. Hernandez's, direction and personally dictated by me on 10/04/2021  . I have reviewed the chart and agree that the record accurately reflects my personal performance of the history, physical exam, assessment and plan. I have also personally directed, reviewed, and agreed with the discharge instructions.

## 2021-10-04 NOTE — PHYSICAL EXAM
[Nasal Endoscopy Performed] : nasal endoscopy was performed, see procedure section for findings [Midline] : trachea located in midline position [Normal] : no rashes [de-identified] : Posttreatment changes, no obvious LAD. [de-identified] : Psoriactic changes along the conchal bowl AS.  [de-identified] : Slight weakness of the marginal nerve on the right.

## 2021-10-13 ENCOUNTER — RESULT REVIEW (OUTPATIENT)
Age: 61
End: 2021-10-13

## 2021-10-13 ENCOUNTER — APPOINTMENT (OUTPATIENT)
Dept: ULTRASOUND IMAGING | Facility: IMAGING CENTER | Age: 61
End: 2021-10-13
Payer: MEDICAID

## 2021-10-13 ENCOUNTER — OUTPATIENT (OUTPATIENT)
Dept: OUTPATIENT SERVICES | Facility: HOSPITAL | Age: 61
LOS: 1 days | End: 2021-10-13
Payer: MEDICAID

## 2021-10-13 DIAGNOSIS — Z98.89 OTHER SPECIFIED POSTPROCEDURAL STATES: Chronic | ICD-10-CM

## 2021-10-13 DIAGNOSIS — Z00.8 ENCOUNTER FOR OTHER GENERAL EXAMINATION: ICD-10-CM

## 2021-10-13 DIAGNOSIS — C77.0 SECONDARY AND UNSPECIFIED MALIGNANT NEOPLASM OF LYMPH NODES OF HEAD, FACE AND NECK: ICD-10-CM

## 2021-10-13 DIAGNOSIS — C11.9 MALIGNANT NEOPLASM OF NASOPHARYNX, UNSPECIFIED: ICD-10-CM

## 2021-10-13 PROCEDURE — 10005 FNA BX W/US GDN 1ST LES: CPT

## 2021-10-13 PROCEDURE — 88305 TISSUE EXAM BY PATHOLOGIST: CPT

## 2021-10-13 PROCEDURE — 88173 CYTOPATH EVAL FNA REPORT: CPT

## 2021-10-13 PROCEDURE — 88172 CYTP DX EVAL FNA 1ST EA SITE: CPT

## 2021-10-13 PROCEDURE — 88305 TISSUE EXAM BY PATHOLOGIST: CPT | Mod: 26

## 2021-10-13 PROCEDURE — 88173 CYTOPATH EVAL FNA REPORT: CPT | Mod: 26

## 2021-11-02 ENCOUNTER — APPOINTMENT (OUTPATIENT)
Dept: NUCLEAR MEDICINE | Facility: IMAGING CENTER | Age: 61
End: 2021-11-02
Payer: MEDICAID

## 2021-11-02 ENCOUNTER — OUTPATIENT (OUTPATIENT)
Dept: OUTPATIENT SERVICES | Facility: HOSPITAL | Age: 61
LOS: 1 days | End: 2021-11-02
Payer: MEDICAID

## 2021-11-02 DIAGNOSIS — Z00.8 ENCOUNTER FOR OTHER GENERAL EXAMINATION: ICD-10-CM

## 2021-11-02 DIAGNOSIS — Z98.89 OTHER SPECIFIED POSTPROCEDURAL STATES: Chronic | ICD-10-CM

## 2021-11-02 PROCEDURE — 78815 PET IMAGE W/CT SKULL-THIGH: CPT

## 2021-11-02 PROCEDURE — 78815 PET IMAGE W/CT SKULL-THIGH: CPT | Mod: 26,PI

## 2021-11-02 PROCEDURE — A9552: CPT

## 2021-11-09 ENCOUNTER — APPOINTMENT (OUTPATIENT)
Dept: OTOLARYNGOLOGY | Facility: CLINIC | Age: 61
End: 2021-11-09
Payer: MEDICAID

## 2021-11-09 PROCEDURE — 99214 OFFICE O/P EST MOD 30 MIN: CPT | Mod: 25

## 2021-11-09 PROCEDURE — 31231 NASAL ENDOSCOPY DX: CPT

## 2021-11-09 NOTE — PHYSICAL EXAM
[Nasal Endoscopy Performed] : nasal endoscopy was performed, see procedure section for findings [Midline] : trachea located in midline position [Normal] : no rashes [de-identified] : Posttreatment changes, no obvious LAD. [de-identified] : Psoriactic changes along the conchal bowl AS.  [de-identified] : Slight weakness of the marginal nerve on the right.

## 2021-11-09 NOTE — HISTORY OF PRESENT ILLNESS
[de-identified] : 62 y/o F with history of NPx in 2011 and s/p revision right parotidectomy 10/2017 presents for follow up. On December 18, 2019 pt had a stroke and she has L arm weakness is much better ( working with PT) and followed by Catherine Romero as PCP. Pt is not followed by a neurologist.  She is eating a regular diet.  She denies any dyspnea, dysphonia, weight loss. 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 PET CT done 11/2/2021 and  here to discuss further management.

## 2021-11-09 NOTE — PROCEDURE
[None] : none [Flexible Endoscope] : examined with the flexible endoscope [Serial Number: ___] : Serial Number: [unfilled] [FreeTextEntry6] : No lesions in the NC or NPx. Posttreatment changes are noted. No evidence of purulence from the OMCs.  [de-identified] : NPC

## 2021-11-26 ENCOUNTER — OUTPATIENT (OUTPATIENT)
Dept: OUTPATIENT SERVICES | Facility: HOSPITAL | Age: 61
LOS: 1 days | End: 2021-11-26
Payer: MEDICAID

## 2021-11-26 VITALS
WEIGHT: 117.95 LBS | HEIGHT: 62 IN | HEART RATE: 80 BPM | TEMPERATURE: 98 F | RESPIRATION RATE: 16 BRPM | SYSTOLIC BLOOD PRESSURE: 118 MMHG | OXYGEN SATURATION: 98 % | DIASTOLIC BLOOD PRESSURE: 82 MMHG

## 2021-11-26 DIAGNOSIS — Z92.3 PERSONAL HISTORY OF IRRADIATION: ICD-10-CM

## 2021-11-26 DIAGNOSIS — Z98.890 OTHER SPECIFIED POSTPROCEDURAL STATES: Chronic | ICD-10-CM

## 2021-11-26 DIAGNOSIS — Z98.89 OTHER SPECIFIED POSTPROCEDURAL STATES: Chronic | ICD-10-CM

## 2021-11-26 DIAGNOSIS — C11.9 MALIGNANT NEOPLASM OF NASOPHARYNX, UNSPECIFIED: ICD-10-CM

## 2021-11-26 DIAGNOSIS — Z90.49 ACQUIRED ABSENCE OF OTHER SPECIFIED PARTS OF DIGESTIVE TRACT: Chronic | ICD-10-CM

## 2021-11-26 DIAGNOSIS — Z90.49 ACQUIRED ABSENCE OF OTHER SPECIFIED PARTS OF DIGESTIVE TRACT: ICD-10-CM

## 2021-11-26 DIAGNOSIS — I10 ESSENTIAL (PRIMARY) HYPERTENSION: ICD-10-CM

## 2021-11-26 LAB
ALBUMIN SERPL ELPH-MCNC: 4.7 G/DL — SIGNIFICANT CHANGE UP (ref 3.3–5)
ALP SERPL-CCNC: 136 U/L — HIGH (ref 40–120)
ALT FLD-CCNC: 20 U/L — SIGNIFICANT CHANGE UP (ref 4–33)
ANION GAP SERPL CALC-SCNC: 12 MMOL/L — SIGNIFICANT CHANGE UP (ref 7–14)
AST SERPL-CCNC: 21 U/L — SIGNIFICANT CHANGE UP (ref 4–32)
BILIRUB SERPL-MCNC: 0.3 MG/DL — SIGNIFICANT CHANGE UP (ref 0.2–1.2)
BUN SERPL-MCNC: 11 MG/DL — SIGNIFICANT CHANGE UP (ref 7–23)
CALCIUM SERPL-MCNC: 8.9 MG/DL — SIGNIFICANT CHANGE UP (ref 8.4–10.5)
CHLORIDE SERPL-SCNC: 102 MMOL/L — SIGNIFICANT CHANGE UP (ref 98–107)
CO2 SERPL-SCNC: 25 MMOL/L — SIGNIFICANT CHANGE UP (ref 22–31)
CREAT SERPL-MCNC: 0.57 MG/DL — SIGNIFICANT CHANGE UP (ref 0.5–1.3)
GLUCOSE SERPL-MCNC: 88 MG/DL — SIGNIFICANT CHANGE UP (ref 70–99)
HCT VFR BLD CALC: 38.5 % — SIGNIFICANT CHANGE UP (ref 34.5–45)
HGB BLD-MCNC: 12.5 G/DL — SIGNIFICANT CHANGE UP (ref 11.5–15.5)
MCHC RBC-ENTMCNC: 29.1 PG — SIGNIFICANT CHANGE UP (ref 27–34)
MCHC RBC-ENTMCNC: 32.5 GM/DL — SIGNIFICANT CHANGE UP (ref 32–36)
MCV RBC AUTO: 89.5 FL — SIGNIFICANT CHANGE UP (ref 80–100)
NRBC # BLD: 0 /100 WBCS — SIGNIFICANT CHANGE UP
NRBC # FLD: 0 K/UL — SIGNIFICANT CHANGE UP
PLATELET # BLD AUTO: 289 K/UL — SIGNIFICANT CHANGE UP (ref 150–400)
POTASSIUM SERPL-MCNC: 3.5 MMOL/L — SIGNIFICANT CHANGE UP (ref 3.5–5.3)
POTASSIUM SERPL-SCNC: 3.5 MMOL/L — SIGNIFICANT CHANGE UP (ref 3.5–5.3)
PROT SERPL-MCNC: 7.5 G/DL — SIGNIFICANT CHANGE UP (ref 6–8.3)
RBC # BLD: 4.3 M/UL — SIGNIFICANT CHANGE UP (ref 3.8–5.2)
RBC # FLD: 13.9 % — SIGNIFICANT CHANGE UP (ref 10.3–14.5)
SODIUM SERPL-SCNC: 139 MMOL/L — SIGNIFICANT CHANGE UP (ref 135–145)
WBC # BLD: 5.95 K/UL — SIGNIFICANT CHANGE UP (ref 3.8–10.5)
WBC # FLD AUTO: 5.95 K/UL — SIGNIFICANT CHANGE UP (ref 3.8–10.5)

## 2021-11-26 PROCEDURE — 93010 ELECTROCARDIOGRAM REPORT: CPT

## 2021-11-26 RX ORDER — APREMILAST 10-20-30MG
1 KIT ORAL
Qty: 0 | Refills: 0 | DISCHARGE

## 2021-11-26 NOTE — H&P PST ADULT - NSICDXPASTSURGICALHX_GEN_ALL_CORE_FT
PAST SURGICAL HISTORY:  S/P ear surgery ear tube drainage placement 2012 (B)    S/P Tonsillectomy Direct laryngoscopy     PAST SURGICAL HISTORY:  H/O parotidectomy (R) 10/2017 (partial)    S/P ear surgery ear tube drainage placement 2012 (B)    S/P Tonsillectomy Direct laryngoscopy,  right neck dissection 2011

## 2021-11-26 NOTE — H&P PST ADULT - NEGATIVE GENERAL GENITOURINARY SYMPTOMS
no hematuria/no renal colic/no flank pain L/no flank pain R/no bladder infections/no incontinence/no dysuria/no urinary hesitancy/normal urinary frequency/no nocturia/normal libido

## 2021-11-26 NOTE — H&P PST ADULT - NEGATIVE GENERAL SYMPTOMS
no fever/no sweating/no anorexia/no weight loss/no weight gain/no polyphagia/no polyuria/no polydipsia/no malaise/no fatigue

## 2021-11-26 NOTE — H&P PST ADULT - NEUROLOGICAL COMMENTS
numbness left thumb and index finger.  Hx Stroke 2019 with LUE mild weakness remaining per pt numbness left thumb and index finger.  Hx Stroke 2019 with LUE mild weakness remaining per pt.  Not followed by Neuro

## 2021-11-26 NOTE — H&P PST ADULT - NEGATIVE OPHTHALMOLOGIC SYMPTOMS
no diplopia/no photophobia/no blurred vision L/no blurred vision R no blurred vision L/no blurred vision R

## 2021-11-26 NOTE — H&P PST ADULT - NSICDXPASTMEDICALHX_GEN_ALL_CORE_FT
PAST MEDICAL HISTORY:  Enlargement of Lymph Node     History of chemotherapy 2011    History of radiation therapy 2011    Malignant neoplasm of nasopharynx, unspecified     Psoriasis      PAST MEDICAL HISTORY:  Enlargement of Lymph Node     History of chemotherapy 2011    History of radiation therapy 2011    Malignant neoplasm of nasopharynx, unspecified     Psoriasis     Stroke 2019

## 2021-11-26 NOTE — H&P PST ADULT - NSICDXFAMILYHX_GEN_ALL_CORE_FT
FAMILY HISTORY:  Hypertension    Sibling  Still living? No  Type 2 diabetes mellitus, Age at diagnosis: Age Unknown

## 2021-11-26 NOTE — H&P PST ADULT - ASSESSMENT
62 y/o female with hx of right neck mass in 2011, Tonsillectomy on 08/26/11. S/P chemo / Radiation therapy (10/2011). S/P revision and right parotidectomy 10/2017.  Pt had FNA of parotid on 10/13/21 which showed SCC.  Scheduled for Revision Right parotidectomy.

## 2021-11-26 NOTE — H&P PST ADULT - NEGATIVE NEUROLOGICAL SYMPTOMS
no transient paralysis/no weakness/no paresthesias/no generalized seizures/no focal seizures/no syncope/no tremors/no vertigo/no loss of sensation/no difficulty walking/no loss of consciousness/no hemiparesis/no facial palsy

## 2021-11-26 NOTE — H&P PST ADULT - NEGATIVE ENMT SYMPTOMS
no hearing difficulty/no sinus symptoms/no nasal congestion/no nasal discharge/no nasal obstruction/no post-nasal discharge/no nose bleeds/no recurrent cold sores/no dysphagia

## 2021-11-26 NOTE — H&P PST ADULT - NEGATIVE SKIN SYMPTOMS
no itching/no change in size/color of mole/no brittle nails/no pitted nails/no hair loss no rash/no itching/no brittle nails/no pitted nails

## 2021-11-26 NOTE — H&P PST ADULT - PROBLEM SELECTOR PLAN 1
Revision Right parotidectomy.    CBC CMP EKG    preop instructions given and explained    Pt reports she will see her PMD on 11/29/21 for pre-op eval. (comorbidities, poor historian)

## 2021-11-26 NOTE — H&P PST ADULT - ENMT COMMENTS
hx of right neck mass in 2011. H/o Direct 2017 Laryngoscopy with biopsy, Tonsillectomy on 08/26/11. S/P chemo / Rdiation therapy (10/2011). S/P revision and right parotidectomy 10/2017.  Pt had FNA or parotid on 10/13/21 which showed SCC.  Scheduled for Revision Right parotidectomy. malignant neoplasm (R) parotid per dx

## 2021-11-26 NOTE — H&P PST ADULT - HISTORY OF PRESENT ILLNESS
60 y/o female with hx of right neck mass in 2011. H/o Direct 2017 Laryngoscopy with biopsy, Tonsillectomy on 08/26/11. S/P chemo / Rdiation therapy (10/2011). S/P revision and right parotidectomy 10/2017.  Pt had FNA or parotid on 10/13/21 which showed SCC.  Scheduled for Revision Right parotidectomy.           Dx malignant neoplasm of nasopharynx in 2016. S/p  right neck dissection, possible right parotidectomy on 01/06/2017.  Pt present for preop eval for scheduled right parotidectomy with frozen section on 10/4/2017.  Per pt recent MRI show parotid mass.  Pt with c/o tingling/stinging discomfort intermittently. 60 y/o female with hx of right neck mass in 2011, Tonsillectomy on 08/26/11. S/P chemo / Radiation therapy (10/2011). S/P revision and right parotidectomy 10/2017.  Pt had FNA of parotid on 10/13/21 which showed SCC.  Scheduled for Revision Right parotidectomy.

## 2021-11-27 DIAGNOSIS — Z01.818 ENCOUNTER FOR OTHER PREPROCEDURAL EXAMINATION: ICD-10-CM

## 2021-11-28 ENCOUNTER — APPOINTMENT (OUTPATIENT)
Dept: DISASTER EMERGENCY | Facility: CLINIC | Age: 61
End: 2021-11-28

## 2021-11-29 LAB — SARS-COV-2 N GENE NPH QL NAA+PROBE: NOT DETECTED

## 2021-11-30 ENCOUNTER — TRANSCRIPTION ENCOUNTER (OUTPATIENT)
Age: 61
End: 2021-11-30

## 2021-11-30 NOTE — ASU PATIENT PROFILE, ADULT - NSICDXPASTMEDICALHX_GEN_ALL_CORE_FT
PAST MEDICAL HISTORY:  Enlargement of Lymph Node     History of chemotherapy 2011    History of radiation therapy 2011    Malignant neoplasm of nasopharynx, unspecified     Psoriasis     Stroke 2019

## 2021-11-30 NOTE — ASU PATIENT PROFILE, ADULT - FALL HARM RISK - UNIVERSAL INTERVENTIONS
Bed in lowest position, wheels locked, appropriate side rails in place/Call bell, personal items and telephone in reach/Instruct patient to call for assistance before getting out of bed or chair/Non-slip footwear when patient is out of bed/Laura to call system/Physically safe environment - no spills, clutter or unnecessary equipment/Purposeful Proactive Rounding/Room/bathroom lighting operational, light cord in reach

## 2021-11-30 NOTE — ASU PATIENT PROFILE, ADULT - NSICDXPASTSURGICALHX_GEN_ALL_CORE_FT
PAST SURGICAL HISTORY:  H/O parotidectomy (R) 10/2017 (partial)    S/P ear surgery ear tube drainage placement 2012 (B)    S/P Tonsillectomy Direct laryngoscopy,  right neck dissection 2011

## 2021-12-01 ENCOUNTER — RESULT REVIEW (OUTPATIENT)
Age: 61
End: 2021-12-01

## 2021-12-01 ENCOUNTER — APPOINTMENT (OUTPATIENT)
Dept: OTOLARYNGOLOGY | Facility: HOSPITAL | Age: 61
End: 2021-12-01

## 2021-12-01 ENCOUNTER — OUTPATIENT (OUTPATIENT)
Dept: OUTPATIENT SERVICES | Facility: HOSPITAL | Age: 61
LOS: 1 days | Discharge: HOME | End: 2021-12-01
Payer: MEDICAID

## 2021-12-01 VITALS
HEART RATE: 99 BPM | TEMPERATURE: 98 F | RESPIRATION RATE: 18 BRPM | SYSTOLIC BLOOD PRESSURE: 117 MMHG | DIASTOLIC BLOOD PRESSURE: 81 MMHG | OXYGEN SATURATION: 98 %

## 2021-12-01 VITALS
WEIGHT: 117.95 LBS | RESPIRATION RATE: 12 BRPM | HEIGHT: 62 IN | TEMPERATURE: 98 F | HEART RATE: 90 BPM | SYSTOLIC BLOOD PRESSURE: 123 MMHG | DIASTOLIC BLOOD PRESSURE: 84 MMHG | OXYGEN SATURATION: 98 %

## 2021-12-01 DIAGNOSIS — C11.9 MALIGNANT NEOPLASM OF NASOPHARYNX, UNSPECIFIED: ICD-10-CM

## 2021-12-01 DIAGNOSIS — Z98.89 OTHER SPECIFIED POSTPROCEDURAL STATES: Chronic | ICD-10-CM

## 2021-12-01 DIAGNOSIS — Z90.49 ACQUIRED ABSENCE OF OTHER SPECIFIED PARTS OF DIGESTIVE TRACT: Chronic | ICD-10-CM

## 2021-12-01 PROCEDURE — 42415 EXCISE PAROTID GLAND/LESION: CPT | Mod: GC

## 2021-12-01 PROCEDURE — 88341 IMHCHEM/IMCYTCHM EA ADD ANTB: CPT | Mod: 26

## 2021-12-01 PROCEDURE — 88365 INSITU HYBRIDIZATION (FISH): CPT | Mod: 26,59

## 2021-12-01 PROCEDURE — 88342 IMHCHEM/IMCYTCHM 1ST ANTB: CPT | Mod: 26

## 2021-12-01 PROCEDURE — 88367 INSITU HYBRIDIZATION AUTO: CPT | Mod: 26

## 2021-12-01 PROCEDURE — 88307 TISSUE EXAM BY PATHOLOGIST: CPT | Mod: 26

## 2021-12-01 RX ORDER — IBUPROFEN 200 MG
600 TABLET ORAL EVERY 6 HOURS
Refills: 0 | Status: DISCONTINUED | OUTPATIENT
Start: 2021-12-01 | End: 2021-12-01

## 2021-12-01 RX ORDER — FENTANYL CITRATE 50 UG/ML
50 INJECTION INTRAVENOUS ONCE
Refills: 0 | Status: DISCONTINUED | OUTPATIENT
Start: 2021-12-01 | End: 2021-12-01

## 2021-12-01 RX ORDER — BACITRACIN ZINC 500 UNIT/G
1 OINTMENT IN PACKET (EA) TOPICAL
Qty: 1 | Refills: 0
Start: 2021-12-01 | End: 2021-12-06

## 2021-12-01 RX ORDER — SODIUM CHLORIDE 9 MG/ML
1000 INJECTION, SOLUTION INTRAVENOUS
Refills: 0 | Status: DISCONTINUED | OUTPATIENT
Start: 2021-12-01 | End: 2021-12-15

## 2021-12-01 RX ORDER — OXYCODONE AND ACETAMINOPHEN 5; 325 MG/1; MG/1
1 TABLET ORAL
Qty: 8 | Refills: 0
Start: 2021-12-01 | End: 2021-12-02

## 2021-12-01 RX ORDER — ACETAMINOPHEN 500 MG
2 TABLET ORAL
Qty: 0 | Refills: 0 | DISCHARGE
Start: 2021-12-01

## 2021-12-01 RX ORDER — IBUPROFEN 200 MG
1 TABLET ORAL
Qty: 0 | Refills: 0 | DISCHARGE
Start: 2021-12-01

## 2021-12-01 RX ORDER — FENTANYL CITRATE 50 UG/ML
25 INJECTION INTRAVENOUS
Refills: 0 | Status: DISCONTINUED | OUTPATIENT
Start: 2021-12-01 | End: 2021-12-01

## 2021-12-01 RX ORDER — OXYCODONE HYDROCHLORIDE 5 MG/1
5 TABLET ORAL EVERY 4 HOURS
Refills: 0 | Status: DISCONTINUED | OUTPATIENT
Start: 2021-12-01 | End: 2021-12-01

## 2021-12-01 RX ORDER — ONDANSETRON 8 MG/1
4 TABLET, FILM COATED ORAL ONCE
Refills: 0 | Status: DISCONTINUED | OUTPATIENT
Start: 2021-12-01 | End: 2021-12-15

## 2021-12-01 RX ORDER — ONDANSETRON 8 MG/1
4 TABLET, FILM COATED ORAL EVERY 6 HOURS
Refills: 0 | Status: DISCONTINUED | OUTPATIENT
Start: 2021-12-01 | End: 2021-12-15

## 2021-12-01 RX ORDER — ACETAMINOPHEN 500 MG
650 TABLET ORAL EVERY 6 HOURS
Refills: 0 | Status: DISCONTINUED | OUTPATIENT
Start: 2021-12-01 | End: 2021-12-15

## 2021-12-01 RX ADMIN — SODIUM CHLORIDE 75 MILLILITER(S): 9 INJECTION, SOLUTION INTRAVENOUS at 17:16

## 2021-12-01 NOTE — BRIEF OPERATIVE NOTE - OPERATION/FINDINGS
enlarged node noted in superficial parotid, adherent to facial nerve which was  from node, node removed.

## 2021-12-01 NOTE — ASU DISCHARGE PLAN (ADULT/PEDIATRIC) - CARE PROVIDER_API CALL
Angel Hernandez)  Otolaryngology  [address] [place] [address]  Phone: [phone]  Fax: [phone]  Follow Up Time:

## 2021-12-01 NOTE — ASU DISCHARGE PLAN (ADULT/PEDIATRIC) - NURSING INSTRUCTIONS
"Giana Unger is a 47 year old female who is being evaluated via a billable video visit.      The patient has been notified of following:     \"This video visit will be conducted via a call between you and your physician/provider. We have found that certain health care needs can be provided without the need for an in-person physical exam.  This service lets us provide the care you need with a video conversation.  If a prescription is necessary we can send it directly to your pharmacy.  If lab work is needed we can place an order for that and you can then stop by our lab to have the test done at a later time.    Video visits are billed at different rates depending on your insurance coverage.  Please reach out to your insurance provider with any questions.    If during the course of the call the physician/provider feels a video visit is not appropriate, you will not be charged for this service.\"    Patient has given verbal consent for Video visit? Yes    How would you like to obtain your AVS? Mail a copy    Patient would like the video invitation sent by: Send to e-mail at: [email or web address]      Video Start Time: 9:15    Giana Unger complains of    Chief Complaint   Patient presents with     Congenital Consult     congenital infundibular pulmonary stenosis       I have reviewed and updated the patient's Past Medical History, Social History, Family History and Medication List.    ALLERGIES  Ancef [cefazolin]; Codeine; Nitrofuran derivatives; and Sulfa drugs     VITALS REPORTED BY PATIENT:    No weight or BP taken at home.    JAMAICA Goodman      Video-Visit Details    Type of service:  Video Visit    Video End Time: 9:40    Originating Location (pt. Location): Home    Distant Location (provider location):  [facility]     Mode of Communication:  Video Conference via [facility]      CARDIOLOGY CONSULTATION:    Please see dictated note    PAST MEDICAL HISTORY:  Past Medical " History:   Diagnosis Date     Congenital infundibular pulmonic stenosis      Esophageal reflux      Heart valve replaced 1/12/2006     Problem list name updated by automated process. Provider to review     Low back pain      Osteopenia      Panhypopituitarism (H)      Pituitary adenoma (H) 1986    surgeries x 3     Primary pulmonary hypertension (H)      Pulmonary valve disorders      S/P pulmonary valve replacement with bioprosthetic valve 2006    and pericardial patch     Sciatica        CURRENT MEDICATIONS:  Current Outpatient Medications   Medication Sig Dispense Refill     AMOXICILLIN PO Take 1,000 mg by mouth TAKES BEFORE DENTAL VISIT       CALCIUM + D 600-200 MG-IU OR TABS 1 TABLET DAILY 0 0     CORTEF 10 MG OR TABS 1 tab po alternating with 1.5 tabs po qday 0 0     drospirenone-ethinyl estradiol (LORYNA) 3-0.02 MG per tablet Take 1 tablet by mouth daily       fish oil-omega-3 fatty acids 1000 MG capsule Take 1 g by mouth daily       LANsoprazole (PREVACID) 30 MG capsule Take 30 mg by mouth daily       Probiotic Product (PROBIOTIC DAILY PO) Take by mouth daily       SYNTHROID 112 MCG OR TABS 1 TABLET DAILY 0 0       PAST SURGICAL HISTORY:  Past Surgical History:   Procedure Laterality Date     C ANESTH,UGI ENDOSCOPY  3/04    Dr Bettencourt/ hiatal hernia     C APPENDECTOMY  1999    [place]     C EXCIS PITUITARY,TRANSNASAL/SEPTAL  1988/90/92    adenoma pituitary     C REPAIR PULMONARY ART STEN  1975    5 months     C REPLACEMENT, PULMONARY VALVE  1/3/2006    [place]     ENDOSCOPIC INJECTION/IMPLANT  4/2005    injection into EGD area     HC COLONOSCOPY THRU STOMA, DIAGNOSTIC  3/04      RADIATION THERAPY SPECIAL TREATMENT PROCEDURE  1992    5 weeks to the pituitary area     HC TOOTH EXTRACTION W/FORCEP  age 19    2 teeth surgeries       ALLERGIES  Ancef [cefazolin]; Codeine; Nitrofuran derivatives; and Sulfa drugs    FAMILY HX:  Family History   Problem Relation Age of Onset     Coronary Artery Disease Sister       Allergies No family hx of      Cancer No family hx of      Diabetes No family hx of      Lipids No family hx of        SOCIAL HX:  Social History     Socioeconomic History     Marital status:      Spouse name: Not on file     Number of children: 0     Years of education: 16     Highest education level: Not on file   Occupational History     Occupation: rn     Employer: [employer]     Comment: Labor and delivery     Employer: [employer]   Social Needs     Financial resource strain: Not on file     Food insecurity     Worry: Not on file     Inability: Not on file     Transportation needs     Medical: Not on file     Non-medical: Not on file   Tobacco Use     Smoking status: Never Smoker     Smokeless tobacco: Never Used   Substance and Sexual Activity     Alcohol use: No     Drug use: No     Sexual activity: Not Currently   Lifestyle     Physical activity     Days per week: Not on file     Minutes per session: Not on file     Stress: Not on file   Relationships     Social connections     Talks on phone: Not on file     Gets together: Not on file     Attends Yarsani service: Not on file     Active member of club or organization: Not on file     Attends meetings of clubs or organizations: Not on file     Relationship status: Not on file     Intimate partner violence     Fear of current or ex partner: Not on file     Emotionally abused: Not on file     Physically abused: Not on file     Forced sexual activity: Not on file   Other Topics Concern      Service No     Blood Transfusions No     Caffeine Concern No     Comment: 1 coffee a day, maybe 1 soda a week     Occupational Exposure Yes     Hobby Hazards No     Sleep Concern No     Stress Concern No     Weight Concern No     Special Diet No     Back Care No     Exercise No     Bike Helmet No     Seat Belt Yes     Self-Exams No     Parent/sibling w/ CABG, MI or angioplasty before 65F 55M? Not Asked   Social History Narrative     Not on file        ROS:  Constitutional: No fever, chills, or sweats. No weight gain/loss.   ENT: No visual disturbance, ear ache, epistaxis, sore throat.   Allergies/Immunologic: Negative.   Respiratory: No cough, hemoptysis.   Cardiovascular: As per HPI.   GI: No nausea, vomiting, hematemesis, melena, or hematochezia.   : No urinary frequency, dysuria, or hematuria.   Integument: Negative.   Psychiatric: Negative.   Neuro: Negative.   Endocrinology: Negative.   Musculoskeletal: No myalgia.    VITAL SIGNS:  There were no vitals taken for this visit.  There is no height or weight on file to calculate BMI.  Wt Readings from Last 2 Encounters:   02/04/20 62.6 kg (138 lb 1.6 oz)   11/27/18 61.6 kg (135 lb 14.4 oz)       PHYSICAL EXAM  Giana Unger IS A 47 year old female.in no acute distress.  HEENT: Unremarkable.    Extremities: No C/C/E. Neuro: Grossly intact.    LABS    Lab Results   Component Value Date    WBC 15.5 01/12/2006     Lab Results   Component Value Date    RBC 4.52 01/12/2006     Lab Results   Component Value Date    HGB 11.9 01/12/2006     Lab Results   Component Value Date    HCT 37.8 01/12/2006     No components found for: MCT  Lab Results   Component Value Date    MCV 83.5 01/12/2006     Lab Results   Component Value Date    MCH 26.4 01/12/2006     Lab Results   Component Value Date    MCHC 31.5 01/12/2006     No results found for: RDW  Lab Results   Component Value Date     01/12/2006      CINDY Conrad MD    Narcotic pain medication may cause nausea or constipation. Take medication with food. Increase fluids and fiber intake. 1 Percocet contains 325mg. of Tylenol (ACETAMINOPHEN) . DO NOT EXCEED 3000mg  of Tylenol in a 24 hour period. Make appointment with MD.

## 2021-12-01 NOTE — ASU DISCHARGE PLAN (ADULT/PEDIATRIC) - CALL YOUR DOCTOR IF YOU HAVE ANY OF THE FOLLOWING:
Swelling that gets worse/Wound/Surgical Site with redness, or foul smelling discharge or pus Bleeding that does not stop/Swelling that gets worse/Pain not relieved by Medications/Fever greater than (need to indicate Fahrenheit or Celsius)/Wound/Surgical Site with redness, or foul smelling discharge or pus/Nausea and vomiting that does not stop/Unable to urinate

## 2021-12-06 LAB — SURGICAL PATHOLOGY STUDY: SIGNIFICANT CHANGE UP

## 2021-12-09 ENCOUNTER — APPOINTMENT (OUTPATIENT)
Dept: OTOLARYNGOLOGY | Facility: CLINIC | Age: 61
End: 2021-12-09
Payer: MEDICAID

## 2021-12-09 VITALS — TEMPERATURE: 97.7 F

## 2021-12-09 PROCEDURE — 99024 POSTOP FOLLOW-UP VISIT: CPT

## 2021-12-09 NOTE — HISTORY OF PRESENT ILLNESS
[de-identified] : s/p revision right parotidectomy on 12/1/2021. Presents today for incision assessment and suture removal. Reports feeling well. Denies fever, dysphagia, dysphonia and or dyspnea  [None] : No associated symptoms are reported.

## 2021-12-09 NOTE — REASON FOR VISIT
[Post-Operative Visit] : a post-operative visit [FreeTextEntry2] : s/p revision right parotidectomy on 12/1/2021

## 2021-12-15 PROBLEM — I63.9 CEREBRAL INFARCTION, UNSPECIFIED: Chronic | Status: ACTIVE | Noted: 2021-11-26

## 2022-01-11 ENCOUNTER — APPOINTMENT (OUTPATIENT)
Dept: OTOLARYNGOLOGY | Facility: CLINIC | Age: 62
End: 2022-01-11
Payer: MEDICAID

## 2022-01-11 VITALS
HEART RATE: 72 BPM | WEIGHT: 117 LBS | SYSTOLIC BLOOD PRESSURE: 120 MMHG | DIASTOLIC BLOOD PRESSURE: 85 MMHG | BODY MASS INDEX: 20.73 KG/M2 | TEMPERATURE: 97.8 F | HEIGHT: 63 IN

## 2022-01-11 PROCEDURE — 99024 POSTOP FOLLOW-UP VISIT: CPT

## 2022-01-11 PROCEDURE — 31231 NASAL ENDOSCOPY DX: CPT | Mod: 58

## 2022-01-11 NOTE — PHYSICAL EXAM
[Nasal Endoscopy Performed] : nasal endoscopy was performed, see procedure section for findings [Midline] : trachea located in midline position [Normal] : no rashes [de-identified] : Posttreatment changes, no obvious LAD. [de-identified] : Psoriactic changes along the conchal bowl AS.  [de-identified] : Slight weakness of the marginal nerve on the right.

## 2022-01-11 NOTE — PROCEDURE
[None] : none [Flexible Endoscope] : examined with the flexible endoscope [Serial Number: ___] : Serial Number: [unfilled] [FreeTextEntry6] : No lesions in the NC or NPx. Posttreatment changes are noted. No evidence of purulence from the OMCs.  [de-identified] : NPC

## 2022-01-11 NOTE — HISTORY OF PRESENT ILLNESS
[de-identified] : 61 year old female presents for post operative visit s/p revision right parotidectomy on 12/1/2021.  States at times has slight pain to touch on right side of neck and around right ear.   Patient denies dysphagia, odynophagia, dyspnea, dysphonia, night sweats, chills, fevers, otalgia, or sudden weight loss.

## 2022-01-11 NOTE — REASON FOR VISIT
[Subsequent Evaluation] : a subsequent evaluation for [FreeTextEntry2] : s/p revision right parotidectomy on 12/1/2021.

## 2022-03-03 ENCOUNTER — APPOINTMENT (OUTPATIENT)
Dept: CT IMAGING | Facility: CLINIC | Age: 62
End: 2022-03-03
Payer: MEDICAID

## 2022-03-03 ENCOUNTER — OUTPATIENT (OUTPATIENT)
Dept: OUTPATIENT SERVICES | Facility: HOSPITAL | Age: 62
LOS: 1 days | End: 2022-03-03
Payer: MEDICAID

## 2022-03-03 ENCOUNTER — APPOINTMENT (OUTPATIENT)
Dept: MRI IMAGING | Facility: CLINIC | Age: 62
End: 2022-03-03
Payer: MEDICAID

## 2022-03-03 DIAGNOSIS — C11.9 MALIGNANT NEOPLASM OF NASOPHARYNX, UNSPECIFIED: ICD-10-CM

## 2022-03-03 DIAGNOSIS — Z98.89 OTHER SPECIFIED POSTPROCEDURAL STATES: Chronic | ICD-10-CM

## 2022-03-03 DIAGNOSIS — Z90.49 ACQUIRED ABSENCE OF OTHER SPECIFIED PARTS OF DIGESTIVE TRACT: Chronic | ICD-10-CM

## 2022-03-03 PROCEDURE — 70542 MRI ORBIT/FACE/NECK W/DYE: CPT | Mod: 26

## 2022-03-03 PROCEDURE — 71250 CT THORAX DX C-: CPT

## 2022-03-03 PROCEDURE — A9585: CPT

## 2022-03-03 PROCEDURE — 71250 CT THORAX DX C-: CPT | Mod: 26

## 2022-03-03 PROCEDURE — 70542 MRI ORBIT/FACE/NECK W/DYE: CPT

## 2022-03-07 ENCOUNTER — APPOINTMENT (OUTPATIENT)
Dept: OTOLARYNGOLOGY | Facility: CLINIC | Age: 62
End: 2022-03-07
Payer: MEDICAID

## 2022-03-07 VITALS
HEIGHT: 63 IN | BODY MASS INDEX: 20.73 KG/M2 | WEIGHT: 117 LBS | SYSTOLIC BLOOD PRESSURE: 129 MMHG | HEART RATE: 88 BPM | DIASTOLIC BLOOD PRESSURE: 85 MMHG

## 2022-03-07 PROCEDURE — 31231 NASAL ENDOSCOPY DX: CPT

## 2022-03-07 PROCEDURE — 99214 OFFICE O/P EST MOD 30 MIN: CPT | Mod: 25

## 2022-03-07 NOTE — HISTORY OF PRESENT ILLNESS
[de-identified] : 61 year old female presents for post operative visit s/p revision right parotidectomy on 12/1/2021.  pt is here to f/u and has no c.o.  Last ct  and MRI 3/3/2022 report pending.  Patient denies dysphagia, odynophagia, dyspnea, dysphonia, night sweats, chills, fevers, otalgia, or sudden weight loss.

## 2022-03-07 NOTE — DATA REVIEWED
[de-identified] : US with essentially a multinodular thyroid gland with multiple subcentimeter nodules in both lobes. No obvious LAD.

## 2022-03-07 NOTE — PROCEDURE
[None] : none [Flexible Endoscope] : examined with the flexible endoscope [Serial Number: ___] : Serial Number: [unfilled] [FreeTextEntry6] : No lesions in the NC or NPx. Posttreatment changes are noted. No evidence of purulence from the OMCs.  [de-identified] : NPC

## 2022-03-07 NOTE — PHYSICAL EXAM
[Nasal Endoscopy Performed] : nasal endoscopy was performed, see procedure section for findings [Midline] : trachea located in midline position [Normal] : orientation to person, place, and time: normal [de-identified] : Posttreatment changes, incision healing well, no obvious LAD. [de-identified] : Psoriactic changes along the conchal bowl AS.  [de-identified] : Slight weakness of the marginal nerve on the right.

## 2022-06-06 ENCOUNTER — APPOINTMENT (OUTPATIENT)
Dept: OTOLARYNGOLOGY | Facility: CLINIC | Age: 62
End: 2022-06-06
Payer: MEDICAID

## 2022-06-06 VITALS
WEIGHT: 117 LBS | SYSTOLIC BLOOD PRESSURE: 123 MMHG | DIASTOLIC BLOOD PRESSURE: 86 MMHG | HEART RATE: 58 BPM | BODY MASS INDEX: 20.73 KG/M2 | HEIGHT: 63 IN

## 2022-06-06 PROCEDURE — 99214 OFFICE O/P EST MOD 30 MIN: CPT | Mod: 25

## 2022-06-06 PROCEDURE — 31231 NASAL ENDOSCOPY DX: CPT

## 2022-06-06 NOTE — PROCEDURE
[None] : none [Flexible Endoscope] : examined with the flexible endoscope [Serial Number: ___] : Serial Number: [unfilled] [FreeTextEntry6] : No lesions in the NC or NPx. Posttreatment changes are noted. No evidence of purulence from the OMCs.  [de-identified] : NPC

## 2022-06-06 NOTE — HISTORY OF PRESENT ILLNESS
[de-identified] : 61 year old female presents for post operative visit s/p revision right parotidectomy on 12/1/2021.  pt is here to f/u and has no c.o.  Patient denies dysphagia, odynophagia, dyspnea, dysphonia, night sweats, chills, fevers, otalgia, or sudden weight loss.  Pt is a 34 y/o male A&Ox4 in NAD c/o bilateral knee pain, R>L, x one month. Pt denies fall/injury, fever/chills. Upon assessment no obvious deformity noted.

## 2022-06-06 NOTE — PHYSICAL EXAM
[Nasal Endoscopy Performed] : nasal endoscopy was performed, see procedure section for findings [Midline] : trachea located in midline position [Normal] : no rashes [de-identified] : Posttreatment changes, incision healing well, no obvious LAD. [de-identified] : Psoriactic changes along the conchal bowl AS.  [de-identified] : Slight weakness of the marginal nerve on the right.

## 2022-07-06 ENCOUNTER — APPOINTMENT (OUTPATIENT)
Dept: MRI IMAGING | Facility: IMAGING CENTER | Age: 62
End: 2022-07-06

## 2022-07-06 ENCOUNTER — OUTPATIENT (OUTPATIENT)
Dept: OUTPATIENT SERVICES | Facility: HOSPITAL | Age: 62
LOS: 1 days | End: 2022-07-06
Payer: MEDICAID

## 2022-07-06 DIAGNOSIS — Z90.49 ACQUIRED ABSENCE OF OTHER SPECIFIED PARTS OF DIGESTIVE TRACT: Chronic | ICD-10-CM

## 2022-07-06 DIAGNOSIS — Z98.89 OTHER SPECIFIED POSTPROCEDURAL STATES: Chronic | ICD-10-CM

## 2022-07-06 DIAGNOSIS — Z00.8 ENCOUNTER FOR OTHER GENERAL EXAMINATION: ICD-10-CM

## 2022-07-06 DIAGNOSIS — C11.9 MALIGNANT NEOPLASM OF NASOPHARYNX, UNSPECIFIED: ICD-10-CM

## 2022-07-06 PROCEDURE — A9585: CPT

## 2022-07-06 PROCEDURE — 70542 MRI ORBIT/FACE/NECK W/DYE: CPT

## 2022-07-06 PROCEDURE — 70542 MRI ORBIT/FACE/NECK W/DYE: CPT | Mod: 26

## 2022-07-13 NOTE — ASU PREOP CHECKLIST - HEIGHT IN INCHES
2 Spironolactone Pregnancy And Lactation Text: This medication can cause feminization of the male fetus and should be avoided during pregnancy. The active metabolite is also found in breast milk.

## 2022-09-13 ENCOUNTER — APPOINTMENT (OUTPATIENT)
Dept: OTOLARYNGOLOGY | Facility: CLINIC | Age: 62
End: 2022-09-13

## 2022-09-13 PROCEDURE — 31231 NASAL ENDOSCOPY DX: CPT

## 2022-09-13 PROCEDURE — 76536 US EXAM OF HEAD AND NECK: CPT

## 2022-09-13 PROCEDURE — 99214 OFFICE O/P EST MOD 30 MIN: CPT | Mod: 25

## 2022-09-13 RX ORDER — LIDOCAINE 5% 700 MG/1
5 PATCH TOPICAL
Qty: 30 | Refills: 3 | Status: DISCONTINUED | COMMUNITY
Start: 2020-06-23 | End: 2022-09-13

## 2022-09-13 NOTE — HISTORY OF PRESENT ILLNESS
[de-identified] : 61 year old female hx NPC and thyroid nodule bx benign  and presents for post operative visit s/p revision right parotidectomy on 12/1/2021. last MRI of face/orbit on  7/6/2022.  pt is here to f/u and has no c.o.  Patient denies dysphagia, odynophagia, dyspnea, dysphonia, night sweats, chills, fevers, otalgia, or sudden weight loss.

## 2022-09-13 NOTE — PROCEDURE
[None] : none [Flexible Endoscope] : examined with the flexible endoscope [Serial Number: ___] : Serial Number: [unfilled] [FreeTextEntry6] : No lesions in the NC or NPx. Posttreatment changes are noted. No evidence of purulence from the OMCs.  [de-identified] : NPC

## 2022-09-13 NOTE — DATA REVIEWED
[de-identified] : US today without any obvious masses in the right parotid bed.  The thyroid gland appears quite stable with multiple subcentimeter thyroid nodules in both lobes.  No pathologic LAD is appreciated in the neck bilaterally.

## 2022-09-13 NOTE — PHYSICAL EXAM
[Nasal Endoscopy Performed] : nasal endoscopy was performed, see procedure section for findings [Midline] : trachea located in midline position [Normal] : no rashes [de-identified] : Posttreatment changes, incision healing well, no obvious LAD. [de-identified] : Psoriactic changes along the conchal bowl AS.  [de-identified] : Slight weakness of the marginal nerve on the right.

## 2022-11-28 ENCOUNTER — APPOINTMENT (OUTPATIENT)
Dept: MRI IMAGING | Facility: CLINIC | Age: 62
End: 2022-11-28

## 2022-11-28 ENCOUNTER — OUTPATIENT (OUTPATIENT)
Dept: OUTPATIENT SERVICES | Facility: HOSPITAL | Age: 62
LOS: 1 days | End: 2022-11-28
Payer: MEDICAID

## 2022-11-28 DIAGNOSIS — Z00.8 ENCOUNTER FOR OTHER GENERAL EXAMINATION: ICD-10-CM

## 2022-11-28 DIAGNOSIS — Z98.89 OTHER SPECIFIED POSTPROCEDURAL STATES: Chronic | ICD-10-CM

## 2022-11-28 DIAGNOSIS — C77.0 SECONDARY AND UNSPECIFIED MALIGNANT NEOPLASM OF LYMPH NODES OF HEAD, FACE AND NECK: ICD-10-CM

## 2022-11-28 DIAGNOSIS — C11.9 MALIGNANT NEOPLASM OF NASOPHARYNX, UNSPECIFIED: ICD-10-CM

## 2022-11-28 DIAGNOSIS — Z90.49 ACQUIRED ABSENCE OF OTHER SPECIFIED PARTS OF DIGESTIVE TRACT: Chronic | ICD-10-CM

## 2022-11-28 PROCEDURE — 70542 MRI ORBIT/FACE/NECK W/DYE: CPT

## 2022-11-28 PROCEDURE — A9585: CPT

## 2022-11-28 PROCEDURE — 71250 CT THORAX DX C-: CPT | Mod: 26

## 2022-11-28 PROCEDURE — 71250 CT THORAX DX C-: CPT

## 2022-11-28 PROCEDURE — 70542 MRI ORBIT/FACE/NECK W/DYE: CPT | Mod: 26

## 2022-12-19 ENCOUNTER — APPOINTMENT (OUTPATIENT)
Dept: OTOLARYNGOLOGY | Facility: CLINIC | Age: 62
End: 2022-12-19

## 2022-12-19 VITALS
OXYGEN SATURATION: 95 % | SYSTOLIC BLOOD PRESSURE: 123 MMHG | WEIGHT: 110 LBS | HEART RATE: 82 BPM | BODY MASS INDEX: 19.49 KG/M2 | DIASTOLIC BLOOD PRESSURE: 83 MMHG | HEIGHT: 63 IN

## 2022-12-19 PROCEDURE — 99214 OFFICE O/P EST MOD 30 MIN: CPT | Mod: 25

## 2022-12-19 PROCEDURE — 31231 NASAL ENDOSCOPY DX: CPT

## 2022-12-19 RX ORDER — MELOXICAM 15 MG/1
15 TABLET ORAL
Qty: 30 | Refills: 0 | Status: COMPLETED | COMMUNITY
Start: 2020-06-23 | End: 2022-12-19

## 2022-12-19 NOTE — PHYSICAL EXAM
[Nasal Endoscopy Performed] : nasal endoscopy was performed, see procedure section for findings [Midline] : trachea located in midline position [Normal] : no rashes [de-identified] : Posttreatment changes, incision healing well, no obvious LAD. [de-identified] : Psoriactic changes along the conchal bowl AS.  [de-identified] : Slight weakness of the marginal nerve on the right.

## 2022-12-19 NOTE — PROCEDURE
[None] : none [Flexible Endoscope] : examined with the flexible endoscope [Serial Number: ___] : Serial Number: [unfilled] [FreeTextEntry6] : No lesions in the NC or NPx. Posttreatment changes are noted. No evidence of purulence from the OMCs.  [de-identified] : NPC

## 2022-12-19 NOTE — HISTORY OF PRESENT ILLNESS
[de-identified] : 62 year old female hx NPC and thyroid nodule bx benign  and presents for post operative visit s/p revision right parotidectomy on 12/1/2021. last MRI of face/orbit and ct chest  11/28/2022 stable.  Pt is here to f/u and has no c.o.  Patient denies dysphagia, odynophagia, dyspnea, dysphonia, night sweats, chills, fevers, otalgia, or sudden weight loss.

## 2023-01-12 NOTE — H&P PST ADULT - HEMATOLOGY/LYMPHATICS
Pt presents to [facility] with c/o LOF. Pt states she was cleaning her house at the time and had a gush of fluid. Pt denies VB, states +FM, and states feeling some ctx. details…

## 2023-03-14 ENCOUNTER — APPOINTMENT (OUTPATIENT)
Dept: OTOLARYNGOLOGY | Facility: CLINIC | Age: 63
End: 2023-03-14

## 2023-04-25 ENCOUNTER — APPOINTMENT (OUTPATIENT)
Dept: OTOLARYNGOLOGY | Facility: CLINIC | Age: 63
End: 2023-04-25
Payer: MEDICAID

## 2023-04-25 VITALS
SYSTOLIC BLOOD PRESSURE: 125 MMHG | DIASTOLIC BLOOD PRESSURE: 84 MMHG | OXYGEN SATURATION: 96 % | HEART RATE: 91 BPM | BODY MASS INDEX: 20.2 KG/M2 | WEIGHT: 114 LBS | HEIGHT: 63 IN

## 2023-04-25 PROCEDURE — 31231 NASAL ENDOSCOPY DX: CPT

## 2023-04-25 PROCEDURE — 99214 OFFICE O/P EST MOD 30 MIN: CPT | Mod: 25

## 2023-04-25 RX ORDER — APREMILAST 30 MG/1
30 TABLET, FILM COATED ORAL
Refills: 0 | Status: ACTIVE | COMMUNITY

## 2023-04-25 RX ORDER — AMLODIPINE BESYLATE 5 MG/1
5 TABLET ORAL
Refills: 0 | Status: ACTIVE | COMMUNITY

## 2023-04-25 NOTE — PHYSICAL EXAM
[Nasal Endoscopy Performed] : nasal endoscopy was performed, see procedure section for findings [Midline] : trachea located in midline position [Normal] : no rashes [de-identified] : Posttreatment changes, incision healing well, no obvious LAD. [de-identified] : Psoriactic changes along the conchal bowl AS.  [de-identified] : Slight weakness of the marginal nerve on the right.

## 2023-04-25 NOTE — PROCEDURE
[None] : none [Flexible Endoscope] : examined with the flexible endoscope [Serial Number: ___] : Serial Number: [unfilled] [FreeTextEntry6] : No lesions in the NC or NPx. Posttreatment changes are noted. No evidence of purulence from the OMCs.  [de-identified] : NPC

## 2023-04-25 NOTE — HISTORY OF PRESENT ILLNESS
[de-identified] : 62 year old female hx NPC and thyroid nodule bx benign  and presents for post operative visit s/p revision right parotidectomy on 12/1/2021. last MRI of face/orbit and ct chest 11/28/2022 stable.\par Patient is here for follow-up and is doing well since last clinic visit. \par Patient denies dysphagia, odynophagia, dyspnea, hemoptysis, dysphonia or otalgia\par Denies recent fevers/infections, chills, night sweats, weight loss.

## 2023-07-03 ENCOUNTER — APPOINTMENT (OUTPATIENT)
Dept: CT IMAGING | Facility: IMAGING CENTER | Age: 63
End: 2023-07-03
Payer: MEDICAID

## 2023-07-03 ENCOUNTER — APPOINTMENT (OUTPATIENT)
Dept: ULTRASOUND IMAGING | Facility: IMAGING CENTER | Age: 63
End: 2023-07-03
Payer: MEDICAID

## 2023-07-03 ENCOUNTER — APPOINTMENT (OUTPATIENT)
Dept: MRI IMAGING | Facility: IMAGING CENTER | Age: 63
End: 2023-07-03
Payer: MEDICAID

## 2023-07-03 ENCOUNTER — OUTPATIENT (OUTPATIENT)
Dept: OUTPATIENT SERVICES | Facility: HOSPITAL | Age: 63
LOS: 1 days | End: 2023-07-03
Payer: MEDICAID

## 2023-07-03 DIAGNOSIS — Z90.49 ACQUIRED ABSENCE OF OTHER SPECIFIED PARTS OF DIGESTIVE TRACT: Chronic | ICD-10-CM

## 2023-07-03 DIAGNOSIS — C77.0 SECONDARY AND UNSPECIFIED MALIGNANT NEOPLASM OF LYMPH NODES OF HEAD, FACE AND NECK: ICD-10-CM

## 2023-07-03 DIAGNOSIS — E04.1 NONTOXIC SINGLE THYROID NODULE: ICD-10-CM

## 2023-07-03 DIAGNOSIS — Z98.89 OTHER SPECIFIED POSTPROCEDURAL STATES: Chronic | ICD-10-CM

## 2023-07-03 DIAGNOSIS — C11.9 MALIGNANT NEOPLASM OF NASOPHARYNX, UNSPECIFIED: ICD-10-CM

## 2023-07-03 DIAGNOSIS — Z00.8 ENCOUNTER FOR OTHER GENERAL EXAMINATION: ICD-10-CM

## 2023-07-03 PROCEDURE — 76536 US EXAM OF HEAD AND NECK: CPT

## 2023-07-03 PROCEDURE — 70542 MRI ORBIT/FACE/NECK W/DYE: CPT | Mod: 26

## 2023-07-03 PROCEDURE — 71250 CT THORAX DX C-: CPT | Mod: 26

## 2023-07-03 PROCEDURE — 76536 US EXAM OF HEAD AND NECK: CPT | Mod: 26

## 2023-07-03 PROCEDURE — A9585: CPT

## 2023-07-03 PROCEDURE — 71250 CT THORAX DX C-: CPT

## 2023-07-03 PROCEDURE — 70542 MRI ORBIT/FACE/NECK W/DYE: CPT

## 2023-07-18 ENCOUNTER — APPOINTMENT (OUTPATIENT)
Dept: OTOLARYNGOLOGY | Facility: CLINIC | Age: 63
End: 2023-07-18
Payer: MEDICAID

## 2023-07-18 VITALS — BODY MASS INDEX: 26.38 KG/M2 | WEIGHT: 114 LBS | HEIGHT: 55 IN

## 2023-07-18 PROCEDURE — 31231 NASAL ENDOSCOPY DX: CPT

## 2023-07-18 PROCEDURE — 99214 OFFICE O/P EST MOD 30 MIN: CPT | Mod: 25

## 2023-07-18 NOTE — HISTORY OF PRESENT ILLNESS
[de-identified] : 62 year old female hx NPC and thyroid nodule bx benign  and presents for post operative visit s/p revision right parotidectomy on 12/1/2021. Patient is here for follow-up and to review sono of thyroid,   CT chest and MRI on  7/3/2023 concern of  New right parotid nodule strongly suspicious for recurrent tumor.  No new cervical adenopathy.\par Pt has no c.o and doing well.  Patient denies dysphagia, odynophagia, dyspnea, hemoptysis, dysphonia or otalgia\par Denies recent fevers/infections, chills, night sweats, weight loss.

## 2023-07-18 NOTE — PROCEDURE
[None] : none [Flexible Endoscope] : examined with the flexible endoscope [Serial Number: ___] : Serial Number: [unfilled] [FreeTextEntry6] : No lesions in the NC or NPx. Posttreatment changes are noted. No evidence of purulence from the OMCs.  [de-identified] : NPC

## 2023-07-18 NOTE — PHYSICAL EXAM
[Nasal Endoscopy Performed] : nasal endoscopy was performed, see procedure section for findings [Midline] : trachea located in midline position [Normal] : no rashes [de-identified] : Posttreatment changes, incision healing well, there is a palpable nontender, mobile, firm LN in the right preauricular parotid bed.   [de-identified] : Psoriactic changes along the conchal bowl AS.  [de-identified] : Slight weakness of the marginal nerve on the right.

## 2023-07-26 NOTE — H&P PST ADULT - NEGATIVE GENERAL SYMPTOMS
Yes - the patient is able to be screened no polydipsia/no polyuria/no malaise/no weight loss/no weight gain/no polyphagia/no anorexia/no sweating/no fatigue/no fever

## 2023-08-15 ENCOUNTER — APPOINTMENT (OUTPATIENT)
Dept: ULTRASOUND IMAGING | Facility: IMAGING CENTER | Age: 63
End: 2023-08-15
Payer: MEDICAID

## 2023-08-15 ENCOUNTER — OUTPATIENT (OUTPATIENT)
Dept: OUTPATIENT SERVICES | Facility: HOSPITAL | Age: 63
LOS: 1 days | End: 2023-08-15
Payer: MEDICAID

## 2023-08-15 ENCOUNTER — RESULT REVIEW (OUTPATIENT)
Age: 63
End: 2023-08-15

## 2023-08-15 DIAGNOSIS — C11.9 MALIGNANT NEOPLASM OF NASOPHARYNX, UNSPECIFIED: ICD-10-CM

## 2023-08-15 DIAGNOSIS — Z90.49 ACQUIRED ABSENCE OF OTHER SPECIFIED PARTS OF DIGESTIVE TRACT: Chronic | ICD-10-CM

## 2023-08-15 DIAGNOSIS — Z98.89 OTHER SPECIFIED POSTPROCEDURAL STATES: Chronic | ICD-10-CM

## 2023-08-15 DIAGNOSIS — C77.0 SECONDARY AND UNSPECIFIED MALIGNANT NEOPLASM OF LYMPH NODES OF HEAD, FACE AND NECK: ICD-10-CM

## 2023-08-15 PROCEDURE — 88342 IMHCHEM/IMCYTCHM 1ST ANTB: CPT | Mod: 26

## 2023-08-15 PROCEDURE — 88364 INSITU HYBRIDIZATION (FISH): CPT | Mod: 26

## 2023-08-15 PROCEDURE — 88341 IMHCHEM/IMCYTCHM EA ADD ANTB: CPT

## 2023-08-15 PROCEDURE — 88305 TISSUE EXAM BY PATHOLOGIST: CPT | Mod: 26

## 2023-08-15 PROCEDURE — 88365 INSITU HYBRIDIZATION (FISH): CPT

## 2023-08-15 PROCEDURE — 20206 BIOPSY MUSCLE PERQ NEEDLE: CPT

## 2023-08-15 PROCEDURE — 88365 INSITU HYBRIDIZATION (FISH): CPT | Mod: 26,59

## 2023-08-15 PROCEDURE — 76942 ECHO GUIDE FOR BIOPSY: CPT

## 2023-08-15 PROCEDURE — 76942 ECHO GUIDE FOR BIOPSY: CPT | Mod: 26

## 2023-08-15 PROCEDURE — 88341 IMHCHEM/IMCYTCHM EA ADD ANTB: CPT | Mod: 26

## 2023-09-13 ENCOUNTER — APPOINTMENT (OUTPATIENT)
Dept: NUCLEAR MEDICINE | Facility: IMAGING CENTER | Age: 63
End: 2023-09-13
Payer: MEDICAID

## 2023-09-13 ENCOUNTER — OUTPATIENT (OUTPATIENT)
Dept: OUTPATIENT SERVICES | Facility: HOSPITAL | Age: 63
LOS: 1 days | End: 2023-09-13
Payer: MEDICAID

## 2023-09-13 DIAGNOSIS — C77.0 SECONDARY AND UNSPECIFIED MALIGNANT NEOPLASM OF LYMPH NODES OF HEAD, FACE AND NECK: ICD-10-CM

## 2023-09-13 DIAGNOSIS — C11.9 MALIGNANT NEOPLASM OF NASOPHARYNX, UNSPECIFIED: ICD-10-CM

## 2023-09-13 DIAGNOSIS — Z90.49 ACQUIRED ABSENCE OF OTHER SPECIFIED PARTS OF DIGESTIVE TRACT: Chronic | ICD-10-CM

## 2023-09-13 DIAGNOSIS — Z00.8 ENCOUNTER FOR OTHER GENERAL EXAMINATION: ICD-10-CM

## 2023-09-13 DIAGNOSIS — Z98.89 OTHER SPECIFIED POSTPROCEDURAL STATES: Chronic | ICD-10-CM

## 2023-09-13 PROCEDURE — 78815 PET IMAGE W/CT SKULL-THIGH: CPT | Mod: 26,PI

## 2023-09-13 PROCEDURE — 78815 PET IMAGE W/CT SKULL-THIGH: CPT

## 2023-09-13 PROCEDURE — A9552: CPT

## 2023-09-22 ENCOUNTER — APPOINTMENT (OUTPATIENT)
Dept: OTOLARYNGOLOGY | Facility: CLINIC | Age: 63
End: 2023-09-22
Payer: MEDICAID

## 2023-09-22 VITALS
BODY MASS INDEX: 19.84 KG/M2 | WEIGHT: 112 LBS | HEART RATE: 70 BPM | HEIGHT: 63 IN | DIASTOLIC BLOOD PRESSURE: 72 MMHG | SYSTOLIC BLOOD PRESSURE: 117 MMHG

## 2023-09-22 PROCEDURE — 99214 OFFICE O/P EST MOD 30 MIN: CPT | Mod: 25

## 2023-09-22 PROCEDURE — 31231 NASAL ENDOSCOPY DX: CPT

## 2023-10-26 ENCOUNTER — OUTPATIENT (OUTPATIENT)
Dept: OUTPATIENT SERVICES | Facility: HOSPITAL | Age: 63
LOS: 1 days | End: 2023-10-26
Payer: MEDICAID

## 2023-10-26 VITALS
SYSTOLIC BLOOD PRESSURE: 128 MMHG | TEMPERATURE: 98 F | RESPIRATION RATE: 18 BRPM | WEIGHT: 111.99 LBS | DIASTOLIC BLOOD PRESSURE: 86 MMHG | HEIGHT: 63 IN | OXYGEN SATURATION: 98 % | HEART RATE: 65 BPM

## 2023-10-26 DIAGNOSIS — Z90.49 ACQUIRED ABSENCE OF OTHER SPECIFIED PARTS OF DIGESTIVE TRACT: Chronic | ICD-10-CM

## 2023-10-26 DIAGNOSIS — C77.0 SECONDARY AND UNSPECIFIED MALIGNANT NEOPLASM OF LYMPH NODES OF HEAD, FACE AND NECK: ICD-10-CM

## 2023-10-26 DIAGNOSIS — C11.9 MALIGNANT NEOPLASM OF NASOPHARYNX, UNSPECIFIED: ICD-10-CM

## 2023-10-26 DIAGNOSIS — I10 ESSENTIAL (PRIMARY) HYPERTENSION: ICD-10-CM

## 2023-10-26 DIAGNOSIS — Z98.89 OTHER SPECIFIED POSTPROCEDURAL STATES: Chronic | ICD-10-CM

## 2023-10-26 LAB
ALBUMIN SERPL ELPH-MCNC: 4.7 G/DL — SIGNIFICANT CHANGE UP (ref 3.3–5)
ALBUMIN SERPL ELPH-MCNC: 4.7 G/DL — SIGNIFICANT CHANGE UP (ref 3.3–5)
ALP SERPL-CCNC: 133 U/L — HIGH (ref 40–120)
ALP SERPL-CCNC: 133 U/L — HIGH (ref 40–120)
ALT FLD-CCNC: 13 U/L — SIGNIFICANT CHANGE UP (ref 4–33)
ALT FLD-CCNC: 13 U/L — SIGNIFICANT CHANGE UP (ref 4–33)
ANION GAP SERPL CALC-SCNC: 12 MMOL/L — SIGNIFICANT CHANGE UP (ref 7–14)
ANION GAP SERPL CALC-SCNC: 12 MMOL/L — SIGNIFICANT CHANGE UP (ref 7–14)
AST SERPL-CCNC: 19 U/L — SIGNIFICANT CHANGE UP (ref 4–32)
AST SERPL-CCNC: 19 U/L — SIGNIFICANT CHANGE UP (ref 4–32)
BILIRUB SERPL-MCNC: 0.4 MG/DL — SIGNIFICANT CHANGE UP (ref 0.2–1.2)
BILIRUB SERPL-MCNC: 0.4 MG/DL — SIGNIFICANT CHANGE UP (ref 0.2–1.2)
BUN SERPL-MCNC: 8 MG/DL — SIGNIFICANT CHANGE UP (ref 7–23)
BUN SERPL-MCNC: 8 MG/DL — SIGNIFICANT CHANGE UP (ref 7–23)
CALCIUM SERPL-MCNC: 9.6 MG/DL — SIGNIFICANT CHANGE UP (ref 8.4–10.5)
CALCIUM SERPL-MCNC: 9.6 MG/DL — SIGNIFICANT CHANGE UP (ref 8.4–10.5)
CHLORIDE SERPL-SCNC: 100 MMOL/L — SIGNIFICANT CHANGE UP (ref 98–107)
CHLORIDE SERPL-SCNC: 100 MMOL/L — SIGNIFICANT CHANGE UP (ref 98–107)
CO2 SERPL-SCNC: 27 MMOL/L — SIGNIFICANT CHANGE UP (ref 22–31)
CO2 SERPL-SCNC: 27 MMOL/L — SIGNIFICANT CHANGE UP (ref 22–31)
CREAT SERPL-MCNC: 0.62 MG/DL — SIGNIFICANT CHANGE UP (ref 0.5–1.3)
CREAT SERPL-MCNC: 0.62 MG/DL — SIGNIFICANT CHANGE UP (ref 0.5–1.3)
EGFR: 101 ML/MIN/1.73M2 — SIGNIFICANT CHANGE UP
EGFR: 101 ML/MIN/1.73M2 — SIGNIFICANT CHANGE UP
GLUCOSE SERPL-MCNC: 81 MG/DL — SIGNIFICANT CHANGE UP (ref 70–99)
GLUCOSE SERPL-MCNC: 81 MG/DL — SIGNIFICANT CHANGE UP (ref 70–99)
HCT VFR BLD CALC: 41.1 % — SIGNIFICANT CHANGE UP (ref 34.5–45)
HCT VFR BLD CALC: 41.1 % — SIGNIFICANT CHANGE UP (ref 34.5–45)
HGB BLD-MCNC: 13.7 G/DL — SIGNIFICANT CHANGE UP (ref 11.5–15.5)
HGB BLD-MCNC: 13.7 G/DL — SIGNIFICANT CHANGE UP (ref 11.5–15.5)
MCHC RBC-ENTMCNC: 29.3 PG — SIGNIFICANT CHANGE UP (ref 27–34)
MCHC RBC-ENTMCNC: 29.3 PG — SIGNIFICANT CHANGE UP (ref 27–34)
MCHC RBC-ENTMCNC: 33.3 GM/DL — SIGNIFICANT CHANGE UP (ref 32–36)
MCHC RBC-ENTMCNC: 33.3 GM/DL — SIGNIFICANT CHANGE UP (ref 32–36)
MCV RBC AUTO: 87.8 FL — SIGNIFICANT CHANGE UP (ref 80–100)
MCV RBC AUTO: 87.8 FL — SIGNIFICANT CHANGE UP (ref 80–100)
NRBC # BLD: 0 /100 WBCS — SIGNIFICANT CHANGE UP (ref 0–0)
NRBC # BLD: 0 /100 WBCS — SIGNIFICANT CHANGE UP (ref 0–0)
NRBC # FLD: 0 K/UL — SIGNIFICANT CHANGE UP (ref 0–0)
NRBC # FLD: 0 K/UL — SIGNIFICANT CHANGE UP (ref 0–0)
PLATELET # BLD AUTO: 325 K/UL — SIGNIFICANT CHANGE UP (ref 150–400)
PLATELET # BLD AUTO: 325 K/UL — SIGNIFICANT CHANGE UP (ref 150–400)
POTASSIUM SERPL-MCNC: 3.6 MMOL/L — SIGNIFICANT CHANGE UP (ref 3.5–5.3)
POTASSIUM SERPL-MCNC: 3.6 MMOL/L — SIGNIFICANT CHANGE UP (ref 3.5–5.3)
POTASSIUM SERPL-SCNC: 3.6 MMOL/L — SIGNIFICANT CHANGE UP (ref 3.5–5.3)
POTASSIUM SERPL-SCNC: 3.6 MMOL/L — SIGNIFICANT CHANGE UP (ref 3.5–5.3)
PROT SERPL-MCNC: 7.7 G/DL — SIGNIFICANT CHANGE UP (ref 6–8.3)
PROT SERPL-MCNC: 7.7 G/DL — SIGNIFICANT CHANGE UP (ref 6–8.3)
RBC # BLD: 4.68 M/UL — SIGNIFICANT CHANGE UP (ref 3.8–5.2)
RBC # BLD: 4.68 M/UL — SIGNIFICANT CHANGE UP (ref 3.8–5.2)
RBC # FLD: 13.5 % — SIGNIFICANT CHANGE UP (ref 10.3–14.5)
RBC # FLD: 13.5 % — SIGNIFICANT CHANGE UP (ref 10.3–14.5)
SODIUM SERPL-SCNC: 139 MMOL/L — SIGNIFICANT CHANGE UP (ref 135–145)
SODIUM SERPL-SCNC: 139 MMOL/L — SIGNIFICANT CHANGE UP (ref 135–145)
WBC # BLD: 5.45 K/UL — SIGNIFICANT CHANGE UP (ref 3.8–10.5)
WBC # BLD: 5.45 K/UL — SIGNIFICANT CHANGE UP (ref 3.8–10.5)
WBC # FLD AUTO: 5.45 K/UL — SIGNIFICANT CHANGE UP (ref 3.8–10.5)
WBC # FLD AUTO: 5.45 K/UL — SIGNIFICANT CHANGE UP (ref 3.8–10.5)

## 2023-10-26 PROCEDURE — 93010 ELECTROCARDIOGRAM REPORT: CPT

## 2023-10-26 NOTE — H&P PST ADULT - PRO TOBACCO TYPE
"Subjective     Chief Complaint   Patient presents with   • Annual Exam     Review of medical issues    • Hypertension       HPI:  Luca Laguerre is a 75 y.o. male RTC In yearly CPE, review of medical issues: 'I am wonderful. I feel good. I dont have any aches'.   1. Rotator cuff tear on R 11/2020 - s/p arthroscopic repair in 2/2021 with Dr. Huang. S/P 37 PT sessions, released with good ROM and no pain issues. \"I have not had any pain at all\".   2. HTN - controlled on one drug. Good home log with similar numbers to today. Joined gym and getting there 2x/ week.  Active in yard on other days.   3. Pre-DM/ Obesity - weight has been down on home scale.  Is back at gym 2x/ week.  \"We have been dieting. I am not eating breads. My wife, she is trying to loose weight too. Stay away from fried foods'.   4. DEVIN -  CPAP, compliant. Sees sleep med in 9/2022, sees annually. 'I do it ever night'. Uses even with travel.   5. Hx of B12 deficiency  - replete in past orally. On 1000mcg B12 qodaily.   6. Pulmonary nodules, sub 5 mm - noted incidentally on CT A/P with urology 9/2021. Not had any f/u imaging yhet.   7. Dyslipidemia -  on atorvastatin M/W/F with good tolerance.   8. Actinic Keratoses -  was on imiquimod once weekly, s/p recent 5 FU topical tx again on face in 2/2022.  \"I just took my face off\".  Sees derm in 9/2022.      The following portions of the patient's history were reviewed and updated as appropriate: allergies, current medications, past family history, past medical history, past social history, past surgical history and problem list.    Review of Systems   Constitutional: Positive for weight loss (intentional). Negative for chills, fever and malaise/fatigue.   HENT: Negative for congestion, hearing loss, odynophagia and sore throat.    Eyes: Negative for discharge, double vision, pain and redness.        Lst eye exam 9/2022. New glasses.  Early cataracts noted.        Cardiovascular: Negative for chest pain, " "dyspnea on exertion, irregular heartbeat, near-syncope, palpitations and syncope.   Respiratory: Negative for cough, shortness of breath, sleep disturbances due to breathing (on CPAP) and snoring.    Endocrine: Negative for polydipsia, polyphagia and polyuria.   Hematologic/Lymphatic: Negative for bleeding problem. Does not bruise/bleed easily (\"I bruise\". Is on ASA daily. ).   Skin: Negative for rash and suspicious lesions.   Musculoskeletal: Negative for arthritis, back pain, joint pain, joint swelling, muscle cramps, muscle weakness and myalgias.   Gastrointestinal: Negative for constipation, diarrhea, dysphagia, heartburn, nausea and vomiting.   Genitourinary: Negative for bladder incontinence, dysuria, frequency, hematuria, hesitancy and incomplete emptying.       Patient Care Team:  Albaro Traore MD as PCP - General (Internal Medicine)    Recent Hospitalizations: No recent hospitalization(s).    Depression Screen:   PHQ-2/PHQ-9 Depression Screening 5/18/2022   Retired PHQ-9 Total Score -   Retired Total Score -   Little Interest or Pleasure in Doing Things 0-->not at all   Feeling Down, Depressed or Hopeless 0-->not at all   PHQ-9: Brief Depression Severity Measure Score 0       Functional and Cognitive Screening:  Functional & Cognitive Status 5/18/2022   Do you have difficulty preparing food and eating? No   Do you have difficulty bathing yourself, getting dressed or grooming yourself? No   Do you have difficulty using the toilet? No   Do you have difficulty moving around from place to place? No   Do you have trouble with steps or getting out of a bed or a chair? No   Current Diet Well Balanced Diet   Dental Exam Up to date   Eye Exam Up to date   Exercise (times per week) 5 times per week   Current Exercises Include Yard Work;Walking   Current Exercise Activities Include -   Do you need help using the phone?  No   Are you deaf or do you have serious difficulty hearing?  No   Do you need help with " "transportation? No   Do you need help shopping? No   Do you need help preparing meals?  No   Do you need help with housework?  No   Do you need help with laundry? No   Do you need help taking your medications? No   Do you need help managing money? No   Do you ever drive or ride in a car without wearing a seat belt? No   Have you felt unusual stress, anger or loneliness in the last month? No   Who do you live with? Spouse   If you need help, do you have trouble finding someone available to you? No   Have you been bothered in the last four weeks by sexual problems? No   Do you have difficulty concentrating, remembering or making decisions? -       Does the patient have evidence of cognitive impairment? no    Does not need ASA but is currently taking (advised patient that ASA is not indicated and patient chooses to stop it)    Vitals:    05/18/22 0953   BP: 130/70   Pulse: (!) 43   Resp: 12   Temp: 97.1 °F (36.2 °C)   SpO2: 97%   Weight: 108 kg (238 lb)   Height: 172.7 cm (68\")   PainSc: 0-No pain       Body mass index is 36.19 kg/m².    Visual Acuity:  No exam data present    Advanced Care Planning:  ACP discussion was held with the patient during this visit. Patient has an advance directive (not in EMR), copy requested.    Objective   Physical Exam  Vitals reviewed.   Constitutional:       General: He is not in acute distress.     Appearance: Normal appearance. He is well-developed. He is obese. He is not ill-appearing or toxic-appearing.   HENT:      Head: Normocephalic and atraumatic.      Right Ear: Hearing, tympanic membrane, ear canal and external ear normal. There is no impacted cerumen.      Left Ear: Hearing, tympanic membrane, ear canal and external ear normal. There is no impacted cerumen.      Nose: Nose normal.      Mouth/Throat:      Mouth: Mucous membranes are moist. No oral lesions.      Pharynx: Oropharynx is clear. Uvula midline. No pharyngeal swelling, oropharyngeal exudate, posterior oropharyngeal " erythema or uvula swelling.   Eyes:      General: Lids are normal. No scleral icterus.        Right eye: No discharge.         Left eye: No discharge.      Extraocular Movements: Extraocular movements intact.      Conjunctiva/sclera: Conjunctivae normal.      Pupils: Pupils are equal, round, and reactive to light.   Neck:      Thyroid: No thyroid mass or thyromegaly.      Vascular: No carotid bruit.   Cardiovascular:      Rate and Rhythm: Normal rate and regular rhythm.      Pulses:           Radial pulses are 2+ on the right side and 2+ on the left side.        Dorsalis pedis pulses are 2+ on the right side and 2+ on the left side.        Posterior tibial pulses are 2+ on the right side and 2+ on the left side.      Heart sounds: Normal heart sounds, S1 normal and S2 normal. No murmur heard.    No friction rub. No gallop.   Pulmonary:      Effort: Pulmonary effort is normal. No respiratory distress.      Breath sounds: Normal breath sounds. No wheezing, rhonchi or rales.   Abdominal:      General: Abdomen is protuberant. Bowel sounds are normal. There is no distension.      Palpations: Abdomen is soft. There is no mass.      Tenderness: There is no abdominal tenderness. There is no guarding or rebound.      Hernia: There is no hernia in the left inguinal area.   Musculoskeletal:         General: Normal range of motion.      Cervical back: Full passive range of motion without pain, normal range of motion and neck supple. No rigidity. No muscular tenderness.      Right lower leg: No edema.      Left lower leg: No edema.   Lymphadenopathy:      Cervical: No cervical adenopathy.   Skin:     General: Skin is warm and dry.      Findings: No rash.   Neurological:      Mental Status: He is alert and oriented to person, place, and time.      Cranial Nerves: No cranial nerve deficit.      Sensory: No sensory deficit.      Motor: No weakness, tremor, atrophy or abnormal muscle tone.      Gait: Gait normal.      Deep Tendon  Reflexes: Reflexes are normal and symmetric.      Reflex Scores:       Patellar reflexes are 2+ on the right side and 2+ on the left side.       Achilles reflexes are 2+ on the right side and 2+ on the left side.  Psychiatric:         Attention and Perception: Attention normal.         Mood and Affect: Mood normal.         Speech: Speech normal.         Behavior: Behavior normal. Behavior is cooperative.         Thought Content: Thought content normal.         Compared to one year ago, the patient feels physical health is the same.  Compared to one year ago, the patient feels mental health is the same.    Reviewed chart for potential of high risk medication in the elderly: yes  Reviewed chart for potential of harmful drug interactions in the elderly:yes    Identification of Risk Factors:  Risk factors include: Advance Directive Discussion  Cardiovascular risk  Colon Cancer Screening  Immunizations Discussed/Encouraged (specific immunizations; Tdap, Hepatitis A Vaccine/Series, Influenza, Pneumococcal 23, Shingrix and COVID19 )  Obesity/Overweight   Prostate Cancer Screening .    Patient Self-Management and Personalized Health Advice  The patient has been provided with information about: diet, exercise and weight management and preventive services including:   · Annual Wellness Visit (AWV)  · Colorectal Cancer Screening, Colonoscopy  · Prostate Cancer Screening .    Discussed the patient's BMI with him. The BMI is above average; BMI management plan is completed.    Assessment & Plan   Diagnoses and all orders for this visit:    1. Healthcare maintenance (Primary)  -     Lipid Panel With LDL / HDL Ratio    2. Essential hypertension  -     Lipid Panel With LDL / HDL Ratio    3. Class 2 obesity    4. Pre-diabetes  -     Lipid Panel With LDL / HDL Ratio    5. Multiple actinic keratoses    6. Dyslipidemia  -     Lipid Panel With LDL / HDL Ratio    7. DEVIN on CPAP    8. Age-related cataract of both eyes, unspecified  age-related cataract type    9. Environmental and seasonal allergies    10. B12 deficiency    11. Pulmonary nodules  -     CT Chest Without Contrast; Future            Diagnoses and all orders for this visit:    Healthcare maintenance  -     Lipid Panel With LDL / HDL Ratio    Essential hypertension  -     Lipid Panel With LDL / HDL Ratio    Class 2 obesity    Pre-diabetes  -     Lipid Panel With LDL / HDL Ratio    Multiple actinic keratoses    Dyslipidemia  -     Lipid Panel With LDL / HDL Ratio    DEVIN on CPAP    Age-related cataract of both eyes, unspecified age-related cataract type    Environmental and seasonal allergies    B12 deficiency    Pulmonary nodules  -     CT Chest Without Contrast; Future        Luca Laguerre is a 75 y.o. male RTC In yearly CPE, review of medical issues:   1. Rotator cuff tear on R 11/2020 - s/p arthroscopic repair in 2/2021 with Dr. Huang. S/P 37 PT sessions, released with good ROM and no pain issues. ANTONIO.   2. HTN - controlled on one drug. Good home log.  BMP OK  3. Pre-DM/ Obesity -  modest weight loss, at gym now and making diet mods at home with decrease portions.  A1C improved today.  C/W TLC mods.   4. DEVIN -  CPAP, compliant. F/U sleep med in 9/2022.  5. Hx of B12 deficiency  - replete on labs, MMA pending today. C/W 1000mcg B12 qodaily.   6. Pulmonary nodules, sub 5 mm - noted incidentally on CT A/P with urology 9/2021. CT chest due 9/2022, order placed and d/w pt.   7. Dyslipidemia - on atorvastatin M/W/F with good tolerance. Trend numbers today as add on to labs.   --> ASA use - d/w pt may stop ASA given no known vascular disease hx; risk vs. Benefit balance discussed.   8. Hx colon polyp, 2006 - 6/2017 C-scope (-) with Dr. MANUELITO Oliva --> 10 years.    9. Actinic Keratoses -  was on imiquimod once weekly, s/p recent repeat 5 FU topical tx on face in 2/2022.  F/U derm in 9/2022.   10. HM - labs d/w pt; Flu/ Tdap/ Pvax/ Prevnar/ Zostavax/ Shingrix/ COVID- UTD; Hep A  and  COVID booster at pharmacy; C-scope (-) 6/2017 --> 10 years; ADEBAYO/ PSA per urology; Hep C Ab (-) 2/2016; Preventative care plan provided to pt at end of visit; c/w exercise       Return in about 6 months (around 11/18/2022) for Recheck. (CMP, A1C, CBC, U/A)          Current Outpatient Medications:   •  atorvastatin (LIPITOR) 10 MG tablet, TAKE 1 TABLET BY MOUTH MONDAY, WEDNESDAY, AND FRIDAY, Disp: 30 tablet, Rfl: 6  •  coenzyme Q10 100 MG capsule, Take 200 mg by mouth Daily. HELD, Disp: , Rfl:   •  folic acid (FOLVITE) 1 MG tablet, Take 1 tablet by mouth Daily., Disp: 30 tablet, Rfl: 5  •  loratadine (CLARITIN) 10 MG tablet, Take 10 mg by mouth Daily., Disp: , Rfl:   •  ramipril (ALTACE) 5 MG capsule, TAKE 1 CAPSULE BY MOUTH DAILY, Disp: 90 capsule, Rfl: 5  •  vitamin B-12 (CYANOCOBALAMIN) 1000 MCG tablet, Take 1 tablet by mouth Daily., Disp: , Rfl:    0.25 cigarettes x25 years, stopped 2011/cigarettes

## 2023-10-26 NOTE — H&P PST ADULT - NEGATIVE ENMT SYMPTOMS
no hearing difficulty/no tinnitus/no sinus symptoms/no nasal congestion/no nasal discharge/no nasal obstruction/no post-nasal discharge/no nose bleeds/no recurrent cold sores/no dysphagia no hearing difficulty/no ear pain/no tinnitus/no sinus symptoms/no nasal congestion/no nasal discharge/no nasal obstruction/no post-nasal discharge/no nose bleeds/no recurrent cold sores/no dysphagia

## 2023-10-26 NOTE — H&P PST ADULT - NSICDXPASTMEDICALHX_GEN_ALL_CORE_FT
PAST MEDICAL HISTORY:  Enlargement of Lymph Node     History of chemotherapy 2011    History of radiation therapy 2011    Malignant neoplasm of nasopharynx, unspecified     Psoriasis     Stroke 2019     PAST MEDICAL HISTORY:  Enlargement of Lymph Node     History of chemotherapy 2011    History of radiation therapy 2011    HTN (hypertension)     Malignant neoplasm of nasopharynx, unspecified     Psoriasis     Stroke 2019

## 2023-10-26 NOTE — H&P PST ADULT - PROBLEM SELECTOR PLAN 1
Schedule for revision right parotidectomy tentatively on 11/01/23. Pre op instructions, famotidine, chlorhexidine gluconate soap given and explained. Pt verbalized understanding.

## 2023-10-26 NOTE — H&P PST ADULT - HISTORY OF PRESENT ILLNESS
62 y/o female with hx of right neck mass in 2011, Tonsillectomy on 08/26/11. S/P chemo / Radiation therapy (10/2011). S/P revision and right parotidectomy 10/2017.  Pt had FNA of parotid on 10/13/21 which showed SCC.  Scheduled for Revision Right parotidectomy.    cancer came back , F/U with surgeon. S/P MRI on 07/2023   62 y/o female with hx of HTN, CVA (12/18/2019) presents with pre op diagnosis: malignant neoplasm of nasopharynx unsp. Schedule for revision right parotidectomy tentatively on 11/01/23

## 2023-10-26 NOTE — H&P PST ADULT - NEGATIVE NEUROLOGICAL SYMPTOMS
no transient paralysis/no paresthesias/no generalized seizures/no focal seizures/no syncope/no tremors/no vertigo/no loss of sensation/no difficulty walking/no headache/no loss of consciousness/no hemiparesis/no facial palsy

## 2023-10-26 NOTE — H&P PST ADULT - NSICDXPASTSURGICALHX_GEN_ALL_CORE_FT
PAST SURGICAL HISTORY:  H/O parotidectomy (R) 10/2017 (partial)    S/P ear surgery ear tube drainage placement 2012 (B)    S/P Tonsillectomy Direct laryngoscopy,  right neck dissection 2011     PAST SURGICAL HISTORY:  H/O parotidectomy (R) 10/2017 (partial)    History of parotidectomy     S/P ear surgery ear tube drainage placement 2012 (B)    S/P Tonsillectomy Direct laryngoscopy,  right neck dissection 2011

## 2023-10-31 ENCOUNTER — TRANSCRIPTION ENCOUNTER (OUTPATIENT)
Age: 63
End: 2023-10-31

## 2023-10-31 NOTE — ASU PATIENT PROFILE, ADULT - NSICDXPASTSURGICALHX_GEN_ALL_CORE_FT
PAST SURGICAL HISTORY:  H/O parotidectomy (R) 10/2017 (partial)    History of parotidectomy     S/P ear surgery ear tube drainage placement 2012 (B)    S/P Tonsillectomy Direct laryngoscopy,  right neck dissection 2011

## 2023-10-31 NOTE — ASU PATIENT PROFILE, ADULT - DOES PATIENT HAVE ADVANCE DIRECTIVE
EGD is added to procedural case for c/o heartburn, nausea  
Procedure Scheduling Information    Procedure:  Colonoscopy  Last Procedure:  10/5/15  Procedure Date:  10/8/18  Procedure Time:  0830  Reason:  History of polyps  Referring provider:  Dr. East's repeat recommendations  Location: [facility]   Prep:  MoviPrep  Sedation:  IV Sedation     If MAC, why:  N/A  Pacemaker/Defibrillator:  no     Device Interrogation Form Faxed:  n/a  Anticoagulation:  no    Prescribing Provider:  n/a  
No

## 2023-10-31 NOTE — ASU PATIENT PROFILE, ADULT - NSICDXPASTMEDICALHX_GEN_ALL_CORE_FT
PAST MEDICAL HISTORY:  Enlargement of Lymph Node     History of chemotherapy 2011    History of radiation therapy 2011    HTN (hypertension)     Malignant neoplasm of nasopharynx, unspecified     Psoriasis     Stroke 2019

## 2023-10-31 NOTE — ASU PATIENT PROFILE, ADULT - PATIENT KNOW
Left vm with results  Started lisinopril 2 5 mg qd and carvedilol 3 125 mg bid sent electronically 
P/C for echo results     #[phone] message
Pt made aware, provided # for GI
Pt wanted you to be aware he has been experiencing heartburn the past several days, and had to restart his Protonix   concerned the heartburn could be cardiac related  He scheduled an OV for 12/5/19 to discuss echo results 
Suggest GI eval asap  He had catheterization with normal arteries in past protonix is a good idea restart  I have placed a referral into GI, maybe NP can see him soon   thx
yes

## 2023-11-01 ENCOUNTER — TRANSCRIPTION ENCOUNTER (OUTPATIENT)
Age: 63
End: 2023-11-01

## 2023-11-01 ENCOUNTER — RESULT REVIEW (OUTPATIENT)
Age: 63
End: 2023-11-01

## 2023-11-01 ENCOUNTER — OUTPATIENT (OUTPATIENT)
Dept: OUTPATIENT SERVICES | Facility: HOSPITAL | Age: 63
LOS: 1 days | Discharge: HOME | End: 2023-11-01
Payer: MEDICAID

## 2023-11-01 ENCOUNTER — APPOINTMENT (OUTPATIENT)
Dept: OTOLARYNGOLOGY | Facility: HOSPITAL | Age: 63
End: 2023-11-01

## 2023-11-01 VITALS
RESPIRATION RATE: 16 BRPM | OXYGEN SATURATION: 96 % | SYSTOLIC BLOOD PRESSURE: 113 MMHG | DIASTOLIC BLOOD PRESSURE: 69 MMHG | HEART RATE: 86 BPM

## 2023-11-01 VITALS
HEART RATE: 75 BPM | OXYGEN SATURATION: 97 % | DIASTOLIC BLOOD PRESSURE: 77 MMHG | SYSTOLIC BLOOD PRESSURE: 130 MMHG | TEMPERATURE: 98 F | HEIGHT: 63 IN | WEIGHT: 111.99 LBS | RESPIRATION RATE: 16 BRPM

## 2023-11-01 DIAGNOSIS — Z98.89 OTHER SPECIFIED POSTPROCEDURAL STATES: Chronic | ICD-10-CM

## 2023-11-01 DIAGNOSIS — C11.9 MALIGNANT NEOPLASM OF NASOPHARYNX, UNSPECIFIED: ICD-10-CM

## 2023-11-01 DIAGNOSIS — Z90.49 ACQUIRED ABSENCE OF OTHER SPECIFIED PARTS OF DIGESTIVE TRACT: Chronic | ICD-10-CM

## 2023-11-01 PROCEDURE — 88307 TISSUE EXAM BY PATHOLOGIST: CPT | Mod: 26

## 2023-11-01 PROCEDURE — 88365 INSITU HYBRIDIZATION (FISH): CPT | Mod: 26

## 2023-11-01 PROCEDURE — 88342 IMHCHEM/IMCYTCHM 1ST ANTB: CPT | Mod: 26

## 2023-11-01 PROCEDURE — 88341 IMHCHEM/IMCYTCHM EA ADD ANTB: CPT | Mod: 26

## 2023-11-01 PROCEDURE — 42420 EXCISE PAROTID GLAND/LESION: CPT | Mod: GC

## 2023-11-01 PROCEDURE — 15733 MUSC MYOQ/FSCQ FLP H&N PEDCL: CPT | Mod: GC

## 2023-11-01 DEVICE — CARTRIDGE MICROCLIP 30: Type: IMPLANTABLE DEVICE | Site: RIGHT | Status: FUNCTIONAL

## 2023-11-01 DEVICE — SURGIFOAM PAD 8CM X 12.5CM X 2MM (100C): Type: IMPLANTABLE DEVICE | Site: RIGHT | Status: FUNCTIONAL

## 2023-11-01 DEVICE — LIGATING CLIPS WECK HORIZON MEDIUM (BLUE) 24: Type: IMPLANTABLE DEVICE | Site: RIGHT | Status: FUNCTIONAL

## 2023-11-01 DEVICE — LIGATING CLIPS WECK HORIZON SMALL-WIDE (RED) 24: Type: IMPLANTABLE DEVICE | Site: RIGHT | Status: FUNCTIONAL

## 2023-11-01 RX ORDER — SODIUM CHLORIDE 9 MG/ML
1000 INJECTION, SOLUTION INTRAVENOUS
Refills: 0 | Status: DISCONTINUED | OUTPATIENT
Start: 2023-11-01 | End: 2023-11-15

## 2023-11-01 RX ORDER — ONDANSETRON 8 MG/1
4 TABLET, FILM COATED ORAL ONCE
Refills: 0 | Status: DISCONTINUED | OUTPATIENT
Start: 2023-11-01 | End: 2023-11-15

## 2023-11-01 RX ORDER — OXYCODONE AND ACETAMINOPHEN 5; 325 MG/1; MG/1
1 TABLET ORAL
Qty: 12 | Refills: 0
Start: 2023-11-01 | End: 2023-11-03

## 2023-11-01 RX ORDER — OXYCODONE HYDROCHLORIDE 5 MG/1
5 TABLET ORAL ONCE
Refills: 0 | Status: DISCONTINUED | OUTPATIENT
Start: 2023-11-01 | End: 2023-11-01

## 2023-11-01 RX ORDER — OXYCODONE HYDROCHLORIDE 5 MG/1
1 TABLET ORAL
Qty: 12 | Refills: 0
Start: 2023-11-01 | End: 2023-11-03

## 2023-11-01 RX ORDER — HYDROMORPHONE HYDROCHLORIDE 2 MG/ML
0.5 INJECTION INTRAMUSCULAR; INTRAVENOUS; SUBCUTANEOUS
Refills: 0 | Status: DISCONTINUED | OUTPATIENT
Start: 2023-11-01 | End: 2023-11-01

## 2023-11-01 RX ORDER — ACETAMINOPHEN 500 MG
650 TABLET ORAL EVERY 6 HOURS
Refills: 0 | Status: DISCONTINUED | OUTPATIENT
Start: 2023-11-01 | End: 2023-11-15

## 2023-11-01 RX ORDER — OXYCODONE HYDROCHLORIDE 5 MG/1
5 TABLET ORAL EVERY 6 HOURS
Refills: 0 | Status: DISCONTINUED | OUTPATIENT
Start: 2023-11-01 | End: 2023-11-01

## 2023-11-01 RX ORDER — IBUPROFEN 200 MG
600 TABLET ORAL EVERY 6 HOURS
Refills: 0 | Status: DISCONTINUED | OUTPATIENT
Start: 2023-11-01 | End: 2023-11-15

## 2023-11-01 RX ADMIN — HYDROMORPHONE HYDROCHLORIDE 0.5 MILLIGRAM(S): 2 INJECTION INTRAMUSCULAR; INTRAVENOUS; SUBCUTANEOUS at 14:32

## 2023-11-01 RX ADMIN — HYDROMORPHONE HYDROCHLORIDE 0.5 MILLIGRAM(S): 2 INJECTION INTRAMUSCULAR; INTRAVENOUS; SUBCUTANEOUS at 14:45

## 2023-11-01 NOTE — ASU DISCHARGE PLAN (ADULT/PEDIATRIC) - ASU DC SPECIAL INSTRUCTIONSFT
For pain, you may use over the counter acetaminophen (Tylenol) and ibuprofen (Motrin).  Take full course of antibiotics (available at [facility]).   Keep incision dry for 48 hours. After 48 hours, incision may get wet but do not scrub incision. Pat dry.   Apply bacitracin to incision twice a day.  Follow up with Dr. Hernandez next week to remove suture. Call his office to schedule or confirm the appointment.

## 2023-11-01 NOTE — ASU DISCHARGE PLAN (ADULT/PEDIATRIC) - CARE PROVIDER_API CALL
Mary Dev  Otolaryngology  [address] [place] [id number]  Phone: [phone]  Fax: [phone]  Follow Up Time:

## 2023-11-01 NOTE — ASU DISCHARGE PLAN (ADULT/PEDIATRIC) - NS MD DC FALL RISK RISK
For information on Fall & Injury Prevention, visit: [email or web address] OR  [email or web address] OR  https://www.cdc.gov/steadi/patient.html

## 2023-11-08 ENCOUNTER — APPOINTMENT (OUTPATIENT)
Dept: OTOLARYNGOLOGY | Facility: CLINIC | Age: 63
End: 2023-11-08
Payer: MEDICAID

## 2023-11-08 PROCEDURE — 99212 OFFICE O/P EST SF 10 MIN: CPT

## 2023-11-10 DIAGNOSIS — G89.18 OTHER ACUTE POSTPROCEDURAL PAIN: ICD-10-CM

## 2023-11-10 LAB
SURGICAL PATHOLOGY STUDY: SIGNIFICANT CHANGE UP
SURGICAL PATHOLOGY STUDY: SIGNIFICANT CHANGE UP

## 2023-11-13 NOTE — ASU DISCHARGE PLAN (ADULT/PEDIATRIC) - NS MD DC FALL RISK RISK
----- Message from NINO Alamo sent at 11/10/2023  3:01 PM CST -----  Regarding: Delay in PHP start  Hello!    Pt's chart has incomplete documentation prior to PHP start, so start is delayed at this time.  I will send another message when start date is determined.    Thank you!  Luci ONEILL, [facility]    ----- Message -----  From: Luci Hull MSW  Sent: 11/10/2023  11:12 AM CST  To: Alicia Waters [facility]; Bushra Wilcox OTR; #  Subject: PHP 2 start on 11/13                               ----- Message -----  From: Alicia Mai  Sent: 11/8/2023   3:27 PM CST  To: Eugenie Marshall [facility]; Isabel Griffith [facility]; #  Subject: Referral for PHP                                 Adult Mental Health Programmatic Care Schedule Request    Patient Name: Maria Alejandra Betts  Location of programming: [facility]  Start Date: 11/13/2023      Adult Program Group: Adult Program Group: PHP 2 Track [[id number]]  Schedule:  M-F 9am-3pm  Number of visits to be scheduled: 10 days    Attending Provider (MD):  Dr Igor Persaud.  Visit Type:  In-Person    Accommodations Needed: No  Alerts Identified/Substantiation: No  Consulted with Supervisor: No           For information on Fall & Injury Prevention, visit: [email or web address] OR  [email or web address] OR  https://www.cdc.gov/steadi/patient.html

## 2023-11-15 NOTE — H&P PST ADULT - LYMPHATIC
Physical Therapy    Patient not seen in therapy.     Pt refused, will attempt again later as able. Rn aware.        OBJECTIVE                         Therapy procedure time and total treatment time can be found documented on the Time Entry flowsheet   supraclavicular L/anterior cervical L/supraclavicular R/anterior cervical R/posterior cervical L/posterior cervical R

## 2023-11-27 ENCOUNTER — NON-APPOINTMENT (OUTPATIENT)
Age: 63
End: 2023-11-27

## 2023-11-28 ENCOUNTER — APPOINTMENT (OUTPATIENT)
Dept: OTOLARYNGOLOGY | Facility: CLINIC | Age: 63
End: 2023-11-28
Payer: MEDICAID

## 2023-11-28 VITALS
WEIGHT: 112 LBS | DIASTOLIC BLOOD PRESSURE: 74 MMHG | SYSTOLIC BLOOD PRESSURE: 108 MMHG | HEIGHT: 63 IN | HEART RATE: 80 BPM | BODY MASS INDEX: 19.84 KG/M2

## 2023-11-28 PROBLEM — I10 ESSENTIAL (PRIMARY) HYPERTENSION: Chronic | Status: ACTIVE | Noted: 2023-10-26

## 2023-11-28 PROCEDURE — 99024 POSTOP FOLLOW-UP VISIT: CPT

## 2023-11-28 PROCEDURE — 31231 NASAL ENDOSCOPY DX: CPT

## 2023-11-29 ENCOUNTER — NON-APPOINTMENT (OUTPATIENT)
Age: 63
End: 2023-11-29

## 2023-12-19 ENCOUNTER — NON-APPOINTMENT (OUTPATIENT)
Age: 63
End: 2023-12-19

## 2023-12-19 ENCOUNTER — APPOINTMENT (OUTPATIENT)
Dept: RADIATION ONCOLOGY | Facility: CLINIC | Age: 63
End: 2023-12-19
Payer: MEDICAID

## 2023-12-19 VITALS
SYSTOLIC BLOOD PRESSURE: 126 MMHG | HEART RATE: 85 BPM | OXYGEN SATURATION: 96 % | BODY MASS INDEX: 19.84 KG/M2 | RESPIRATION RATE: 16 BRPM | TEMPERATURE: 96.98 F | HEIGHT: 63 IN | WEIGHT: 112 LBS | DIASTOLIC BLOOD PRESSURE: 84 MMHG

## 2023-12-19 DIAGNOSIS — Z92.3 PERSONAL HISTORY OF IRRADIATION: ICD-10-CM

## 2023-12-19 PROCEDURE — 99244 OFF/OP CNSLTJ NEW/EST MOD 40: CPT

## 2023-12-19 RX ORDER — OXYCODONE 5 MG/1
5 TABLET ORAL EVERY 8 HOURS
Qty: 18 | Refills: 0 | Status: DISCONTINUED | COMMUNITY
Start: 2023-11-10 | End: 2023-12-19

## 2023-12-19 NOTE — VITALS
"Patient reports that albuterol (PROAIR RESPICLICK) is not working for her and she is unable to get the medication out when she inhales. She would like to go back to the previous \"rescue inhaler\" she had in the past. She also reports she had what she believes was a \"hot flash\" last night. Please call her back to advise at [phone] (home)     " [Maximal Pain Intensity: 8/10] : 8/10 [Least Pain Intensity: 0/10] : 0/10 [Pain Description/Quality: ___] : Pain description/quality: [unfilled] [Pain Duration: ___] : Pain duration: [unfilled] [Pain Location: ___] : Pain Location: [unfilled] [Pain Interferes with ADLs] : Pain interferes with activities of daily living. [NoTreatment Scheduled] : no treatment scheduled [80: Normal activity with effort; some signs or symptoms of disease.] : 80: Normal activity with effort; some signs or symptoms of disease.  [ECOG Performance Status: 1 - Restricted in physically strenuous activity but ambulatory and able to carry out work of a light or sedentary nature] : Performance Status: 1 - Restricted in physically strenuous activity but ambulatory and able to carry out work of a light or sedentary nature, e.g., light house work, office work [0 - No Distress] : Distress Level: 0

## 2023-12-20 ENCOUNTER — OUTPATIENT (OUTPATIENT)
Dept: OUTPATIENT SERVICES | Facility: HOSPITAL | Age: 63
LOS: 1 days | Discharge: HOME | End: 2023-12-20
Payer: MEDICAID

## 2023-12-20 ENCOUNTER — OUTPATIENT (OUTPATIENT)
Dept: OUTPATIENT SERVICES | Facility: HOSPITAL | Age: 63
LOS: 1 days | Discharge: HOME | End: 2023-12-20

## 2023-12-20 DIAGNOSIS — Z98.89 UNDEFINED: Chronic | ICD-10-CM

## 2023-12-20 DIAGNOSIS — Z90.49 ACQUIRED ABSENCE OF OTHER SPECIFIED PARTS OF DIGESTIVE TRACT: Chronic | ICD-10-CM

## 2023-12-20 DIAGNOSIS — C07 MALIGNANT NEOPLASM OF PAROTID GLAND: ICD-10-CM

## 2023-12-21 ENCOUNTER — OUTPATIENT (OUTPATIENT)
Dept: OUTPATIENT SERVICES | Facility: HOSPITAL | Age: 63
LOS: 1 days | Discharge: HOME | End: 2023-12-21

## 2023-12-21 DIAGNOSIS — Z90.49 ACQUIRED ABSENCE OF OTHER SPECIFIED PARTS OF DIGESTIVE TRACT: Chronic | ICD-10-CM

## 2023-12-21 DIAGNOSIS — C11.9 MALIGNANT NEOPLASM OF NASOPHARYNX, UNSPECIFIED: ICD-10-CM

## 2023-12-21 DIAGNOSIS — Z98.89 OTHER SPECIFIED POSTPROCEDURAL STATES: Chronic | ICD-10-CM

## 2023-12-27 NOTE — HISTORY OF PRESENT ILLNESS
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  She had a new parotid LN which on FNA was reported as nonkeratinizing SCCa and on pathology review is similar in morphology to her original disease.  She underwent parotidectomy and upper neck dissection in October 2017.  She has a thyroid isthmic nodule which on US had a small solid component.  A FNA of this was performed which showed AUS ([place] III) but molecular testing was negative.  On followup US, the lesion has less of a cystic component and overall measures significantly smaller and remains stable from last US.  Her imaging then reported a concerning node in the right parotid bed which showed new restricted diffusion and was suspicious for recurrent disease.  This was confirmed with FNA and a subsequent PET/CT in 2021 essentially showed disease confined to the parotid bed.  She underwent a  revision parotidectomy and she is s/p this procedure with final pathology confirming the disease in one node in December 2021.  Her most recent imaging in 2023  reported a new parotid node which was quite concerning and on exam she did indeed have a palpable node in the right parotid bed.  A FNA was again consistent with recurrent disease and PET/CT showed this confined to the right parotid gland with a small secondary lesion deeper in the gland. She underwent a revsion of right parotidectomy she is s/p this procedure with intraoperatively this lesion was noted to be deep to the facial nerve and final pathology shows this to be consistent with recurrent disease with 7 negative LNs.  She is healing well.  She reports dysphagia postoperatively but grossly has intact function.  She feels that she is improving.    MRI face/orbit 09/20/21 Enlarging right parotid nodule measuring up to 1.1 cm with new associated restricted diffusion, suspicious for recurrent/metastatic disease.  MRI orbit/ace/neck 11 apr 2022 Interval resection of lesion in the right parotid region. Interval development of ill-defined area of enhancement measuring up to 2 cm in the right parotid bed, which could represent postsurgical changes, but neoplasm cannot be ruled out. Recommend short interval follow-up versus tissue sampling.  -Increasing size of left level Ia node, now measuring up to 1.6 cm, nonspecific. Recommend short interval follow-up versus tissue sampling.  MRI face/orbit/neck 09/22/23 New right parotid nodule strongly suspicious for recurrent tumor.  Patchy soft tissue thickening within the parotidectomy bed on the right similar in appearance likely representing post operative/post treatment changes.  No new cervical adenopathy.  9/2023 PET ct nodules in region of right parotid bed are compatible with metastatic disease. Two FDG-avid nodules in region of right parotid bed are compatible with metastatic disease. One nodule is new, and the other is increased in size and metabolism. A previously seen FDG-avid nodule in right parotid bed has been resected.  2. Mildly FDG-avid left level IB cervical lymph nodes are unchanged in size and metabolism. A benign etiology is favored.  3. No evidence of FDG avid distant metastasis.   Pathology report from post revision right parotidectomy on 11/1/2023 for susp of mets nodes, path Metastatic squamous cell carcinoma, non-keratinizing, involving fibroadipose tissue and lymphoid tissue, Seven lymph nodes negative for metastatic carcinoma ,In-situ hybridization for MINO performed on block 1C is negative

## 2023-12-27 NOTE — PHYSICAL EXAM
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

## 2023-12-27 NOTE — REVIEW OF SYSTEMS
[Dysphagia] : dysphagia [Hearing Disturbances] : hearing disturbances [Negative] : Allergic/Immunologic [Loss of Hearing] : no loss of hearing [Nosebleeds] : no nosebleeds

## 2024-01-02 ENCOUNTER — APPOINTMENT (OUTPATIENT)
Dept: HEMATOLOGY ONCOLOGY | Facility: CLINIC | Age: 64
End: 2024-01-02
Payer: MEDICAID

## 2024-01-02 ENCOUNTER — NON-APPOINTMENT (OUTPATIENT)
Age: 64
End: 2024-01-02

## 2024-01-02 ENCOUNTER — RESULT REVIEW (OUTPATIENT)
Age: 64
End: 2024-01-02

## 2024-01-02 VITALS
WEIGHT: 112.43 LBS | DIASTOLIC BLOOD PRESSURE: 85 MMHG | SYSTOLIC BLOOD PRESSURE: 127 MMHG | RESPIRATION RATE: 17 BRPM | HEART RATE: 125 BPM | BODY MASS INDEX: 20.96 KG/M2 | TEMPERATURE: 96.3 F | OXYGEN SATURATION: 100 % | HEIGHT: 61.54 IN

## 2024-01-02 DIAGNOSIS — Z87.891 PERSONAL HISTORY OF NICOTINE DEPENDENCE: ICD-10-CM

## 2024-01-02 LAB
BASOPHILS # BLD AUTO: 0.05 K/UL — SIGNIFICANT CHANGE UP (ref 0–0.2)
BASOPHILS # BLD AUTO: 0.05 K/UL — SIGNIFICANT CHANGE UP (ref 0–0.2)
BASOPHILS NFR BLD AUTO: 0.7 % — SIGNIFICANT CHANGE UP (ref 0–2)
BASOPHILS NFR BLD AUTO: 0.7 % — SIGNIFICANT CHANGE UP (ref 0–2)
EOSINOPHIL # BLD AUTO: 0.1 K/UL — SIGNIFICANT CHANGE UP (ref 0–0.5)
EOSINOPHIL # BLD AUTO: 0.1 K/UL — SIGNIFICANT CHANGE UP (ref 0–0.5)
EOSINOPHIL NFR BLD AUTO: 1.4 % — SIGNIFICANT CHANGE UP (ref 0–6)
EOSINOPHIL NFR BLD AUTO: 1.4 % — SIGNIFICANT CHANGE UP (ref 0–6)
HCT VFR BLD CALC: 39.9 % — SIGNIFICANT CHANGE UP (ref 34.5–45)
HCT VFR BLD CALC: 39.9 % — SIGNIFICANT CHANGE UP (ref 34.5–45)
HGB BLD-MCNC: 13.9 G/DL — SIGNIFICANT CHANGE UP (ref 11.5–15.5)
HGB BLD-MCNC: 13.9 G/DL — SIGNIFICANT CHANGE UP (ref 11.5–15.5)
IMM GRANULOCYTES NFR BLD AUTO: 0.3 % — SIGNIFICANT CHANGE UP (ref 0–0.9)
IMM GRANULOCYTES NFR BLD AUTO: 0.3 % — SIGNIFICANT CHANGE UP (ref 0–0.9)
LYMPHOCYTES # BLD AUTO: 0.96 K/UL — LOW (ref 1–3.3)
LYMPHOCYTES # BLD AUTO: 0.96 K/UL — LOW (ref 1–3.3)
LYMPHOCYTES # BLD AUTO: 13.7 % — SIGNIFICANT CHANGE UP (ref 13–44)
LYMPHOCYTES # BLD AUTO: 13.7 % — SIGNIFICANT CHANGE UP (ref 13–44)
MCHC RBC-ENTMCNC: 29.4 PG — SIGNIFICANT CHANGE UP (ref 27–34)
MCHC RBC-ENTMCNC: 29.4 PG — SIGNIFICANT CHANGE UP (ref 27–34)
MCHC RBC-ENTMCNC: 34.8 G/DL — SIGNIFICANT CHANGE UP (ref 32–36)
MCHC RBC-ENTMCNC: 34.8 G/DL — SIGNIFICANT CHANGE UP (ref 32–36)
MCV RBC AUTO: 84.4 FL — SIGNIFICANT CHANGE UP (ref 80–100)
MCV RBC AUTO: 84.4 FL — SIGNIFICANT CHANGE UP (ref 80–100)
MONOCYTES # BLD AUTO: 0.28 K/UL — SIGNIFICANT CHANGE UP (ref 0–0.9)
MONOCYTES # BLD AUTO: 0.28 K/UL — SIGNIFICANT CHANGE UP (ref 0–0.9)
MONOCYTES NFR BLD AUTO: 4 % — SIGNIFICANT CHANGE UP (ref 2–14)
MONOCYTES NFR BLD AUTO: 4 % — SIGNIFICANT CHANGE UP (ref 2–14)
NEUTROPHILS # BLD AUTO: 5.58 K/UL — SIGNIFICANT CHANGE UP (ref 1.8–7.4)
NEUTROPHILS # BLD AUTO: 5.58 K/UL — SIGNIFICANT CHANGE UP (ref 1.8–7.4)
NEUTROPHILS NFR BLD AUTO: 79.9 % — HIGH (ref 43–77)
NEUTROPHILS NFR BLD AUTO: 79.9 % — HIGH (ref 43–77)
NRBC # BLD: 0 /100 WBCS — SIGNIFICANT CHANGE UP (ref 0–0)
NRBC # BLD: 0 /100 WBCS — SIGNIFICANT CHANGE UP (ref 0–0)
PLATELET # BLD AUTO: 312 K/UL — SIGNIFICANT CHANGE UP (ref 150–400)
PLATELET # BLD AUTO: 312 K/UL — SIGNIFICANT CHANGE UP (ref 150–400)
RBC # BLD: 4.73 M/UL — SIGNIFICANT CHANGE UP (ref 3.8–5.2)
RBC # BLD: 4.73 M/UL — SIGNIFICANT CHANGE UP (ref 3.8–5.2)
RBC # FLD: 13.1 % — SIGNIFICANT CHANGE UP (ref 10.3–14.5)
RBC # FLD: 13.1 % — SIGNIFICANT CHANGE UP (ref 10.3–14.5)
WBC # BLD: 6.99 K/UL — SIGNIFICANT CHANGE UP (ref 3.8–10.5)
WBC # BLD: 6.99 K/UL — SIGNIFICANT CHANGE UP (ref 3.8–10.5)
WBC # FLD AUTO: 6.99 K/UL — SIGNIFICANT CHANGE UP (ref 3.8–10.5)
WBC # FLD AUTO: 6.99 K/UL — SIGNIFICANT CHANGE UP (ref 3.8–10.5)

## 2024-01-02 PROCEDURE — 93010 ELECTROCARDIOGRAM REPORT: CPT

## 2024-01-02 PROCEDURE — 99245 OFF/OP CONSLTJ NEW/EST HI 55: CPT

## 2024-01-02 NOTE — REVIEW OF SYSTEMS
[Dysphagia] : dysphagia [Diarrhea: Grade 0] : Diarrhea: Grade 0 [Negative] : Allergic/Immunologic [FreeTextEntry4] : as above

## 2024-01-02 NOTE — PHYSICAL EXAM
[Fully active, able to carry on all pre-disease performance without restriction] : Status 0 - Fully active, able to carry on all pre-disease performance without restriction [Normal] : affect appropriate [de-identified] : scars from prior surgeries

## 2024-01-02 NOTE — CONSULT LETTER
[Dear  ___] : Dear  [unfilled], [Consult Letter:] : I had the pleasure of evaluating your patient, [unfilled]. [Please see my note below.] : Please see my note below. [Consult Closing:] : Thank you very much for allowing me to participate in the care of this patient.  If you have any questions, please do not hesitate to contact me. [Sincerely,] : Sincerely, [DrJudie  ___] : Dr. GONZALEZ [FreeTextEntry2] : Dr. Angle Hernandez [FreeTextEntry3] : Shania Nieves MD

## 2024-01-02 NOTE — ASSESSMENT
[Future Reassessment of Pain Scale] : Future reassessment of pain scale    [Medication(s)] : Medication(s) [FreeTextEntry1] : 64 yo w with HPV positive NPC, multiple locoregional recurrences, here for a consult visit She is s/p recent surgery  Path showed below findings 1. 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 Sections show a poorly differentiated neoplasm with abundant lymphoid stroma involving fibroadipose tissue. One fragment is suggestive of metastasis to a lymph node confirmed by positive immunoreactivity to P40 but given the tumor morphology it is difficult to be certain if this represents a lymph node. In block 1D, seven lymph nodes are identified and are negative for metastatic carcinoma, confirmed by negative immunoreactivity to p40.  Pt has seen Dr. Harley who has recommended adjuvant RT She is here to seek alternate approaches. She previously received chemo (cisplatin) and RT  I discussed that Rt is perhaps the best approach local control. I would recommend adding cetuximab along with SBRT if that is the plan However, is pt is not interested in RT, tx with IO with palliative/disease control intent is appropriate. In this case, I would recommend Pembrolizumab or nivolumab  Another option is to just monitor closely.  I discussed the regimen in detail including potential benefits and AEs os each strategy in detail. I answered all questions. we went over schedule and other pertinent details. Pt signed informed consent. for Erbitux concurrent with RT Will request HPV testing from path as well as PDL1 and NGS Labs today Will send Navdex to [facility] tumor  Need scans prior to any tx decision. will order today

## 2024-01-02 NOTE — HISTORY OF PRESENT ILLNESS
[Disease: _____________________] : Disease: [unfilled] [AJCC Stage: ____] : AJCC Stage: [unfilled] [de-identified] : 63 year old female hx NPC and thyroid nodule bx with history of prior treatment with chemoRT in 2011, in 2017 she underwent a right parotidectomy for a recurrent NPC pathology was positive 1 LN positive for metatstic SCC, HPV positive.  She had a new parotid LN which on FNA was reported as nonkeratinizing SCCa and on pathology review is similar in morphology to her original disease. She underwent parotidectomy and upper neck dissection in October 2017. She has a thyroid isthmic nodule which on US had a small solid component. A FNA of this was performed which showed AUS ([place] III) but molecular testing was negative. On follow up US, the lesion has less of a cystic component and overall measures significantly smaller and remains stable from last US. Her imaging then reported a concerning node in the right parotid bed which showed new restricted diffusion and was suspicious for recurrent disease. This was confirmed with FNA and a subsequent PET/CT in 2021 essentially showed disease confined to the parotid bed. She underwent a revision parotidectomy and she is s/p this procedure with final pathology confirming the disease in one node in December 2021. Her most recent imaging in 2023 reported a new parotid node which was quite concerning and on exam she did indeed have a palpable node in the right parotid bed. A FNA was again consistent with recurrent disease and PET/CT showed this confined to the right parotid gland with a small secondary lesion deeper in the gland. She underwent a revsion of right parotidectomy she is s/p this procedure with intraoperatively this lesion was noted to be deep to the facial nerve and final pathology shows this to be consistent with recurrent disease with 7 negative LNs. She is healing well. She reports dysphagia postoperatively but grossly has intact function. She feels that she is improving.  Recent imaging are as follows:  MRI face/orbit/neck 09/22/23 New right parotid nodule strongly suspicious for recurrent tumor.  Patchy soft tissue thickening within the parotidectomy bed on the right similar in appearance likely representing post operative/post treatment changes.  No new cervical adenopathy.  9/2023 PET ct nodules in region of right parotid bed are compatible with metastatic disease. Two FDG-avid nodules in region of right parotid bed are compatible with metastatic disease. One nodule is new, and the other is increased in size and metabolism. A previously seen FDG-avid nodule in right parotid bed has been resected.  2. Mildly FDG-avid left level IB cervical lymph nodes are unchanged in size and metabolism. A benign etiology is favored.  3. No evidence of FDG avid distant metastasis.   Pathology report from post revision right parotidectomy on 11/1/2023 for susp of mets nodes, path Metastatic squamous cell carcinoma, non-keratinizing, involving fibroadipose tissue and lymphoid tissue, Seven lymph nodes negative for metastatic carcinoma ,In-situ hybridization for MINO performed on block 1C is negative. Previous tumor was HPV pos.  [de-identified] : sq cell ca

## 2024-01-04 ENCOUNTER — APPOINTMENT (OUTPATIENT)
Dept: CT IMAGING | Facility: IMAGING CENTER | Age: 64
End: 2024-01-04
Payer: MEDICAID

## 2024-01-04 ENCOUNTER — OUTPATIENT (OUTPATIENT)
Dept: OUTPATIENT SERVICES | Facility: HOSPITAL | Age: 64
LOS: 1 days | End: 2024-01-04
Payer: MEDICAID

## 2024-01-04 DIAGNOSIS — Z90.49 ACQUIRED ABSENCE OF OTHER SPECIFIED PARTS OF DIGESTIVE TRACT: Chronic | ICD-10-CM

## 2024-01-04 DIAGNOSIS — C77.0 SECONDARY AND UNSPECIFIED MALIGNANT NEOPLASM OF LYMPH NODES OF HEAD, FACE AND NECK: ICD-10-CM

## 2024-01-04 DIAGNOSIS — Z98.89 OTHER SPECIFIED POSTPROCEDURAL STATES: Chronic | ICD-10-CM

## 2024-01-04 LAB
ALBUMIN SERPL ELPH-MCNC: 4.9 G/DL
ALP BLD-CCNC: 161 U/L
ALT SERPL-CCNC: 18 U/L
ANION GAP SERPL CALC-SCNC: 16 MMOL/L
AST SERPL-CCNC: 25 U/L
BILIRUB SERPL-MCNC: 0.6 MG/DL
BUN SERPL-MCNC: 13 MG/DL
CALCIUM SERPL-MCNC: 9.1 MG/DL
CHLORIDE SERPL-SCNC: 101 MMOL/L
CO2 SERPL-SCNC: 24 MMOL/L
CREAT SERPL-MCNC: 0.56 MG/DL
EGFR: 104 ML/MIN/1.73M2
GLUCOSE SERPL-MCNC: 111 MG/DL
HAV IGM SER QL: NONREACTIVE
HBV CORE IGM SER QL: NONREACTIVE
HBV SURFACE AG SER QL: NONREACTIVE
HCV AB SER QL: NONREACTIVE
HCV S/CO RATIO: 0.12 S/CO
MAGNESIUM SERPL-MCNC: 1.9 MG/DL
POTASSIUM SERPL-SCNC: 4.3 MMOL/L
PROT SERPL-MCNC: 7.6 G/DL
SODIUM SERPL-SCNC: 140 MMOL/L
TSH SERPL-ACNC: 2.38 UIU/ML

## 2024-01-04 PROCEDURE — 71260 CT THORAX DX C+: CPT | Mod: 26

## 2024-01-04 PROCEDURE — 70491 CT SOFT TISSUE NECK W/DYE: CPT | Mod: 26

## 2024-01-04 PROCEDURE — 70491 CT SOFT TISSUE NECK W/DYE: CPT

## 2024-01-04 PROCEDURE — 71260 CT THORAX DX C+: CPT

## 2024-01-09 PROCEDURE — 77263 THER RADIOLOGY TX PLNG CPLX: CPT

## 2024-01-09 PROCEDURE — 77334 RADIATION TREATMENT AID(S): CPT | Mod: 26

## 2024-01-26 PROCEDURE — 77334 RADIATION TREATMENT AID(S): CPT | Mod: 26

## 2024-01-26 PROCEDURE — 77295 3-D RADIOTHERAPY PLAN: CPT | Mod: 26

## 2024-01-26 PROCEDURE — 77300 RADIATION THERAPY DOSE PLAN: CPT | Mod: 26

## 2024-02-01 ENCOUNTER — APPOINTMENT (OUTPATIENT)
Dept: INFUSION THERAPY | Facility: HOSPITAL | Age: 64
End: 2024-02-01

## 2024-02-05 ENCOUNTER — NON-APPOINTMENT (OUTPATIENT)
Age: 64
End: 2024-02-05

## 2024-02-06 ENCOUNTER — RESULT REVIEW (OUTPATIENT)
Age: 64
End: 2024-02-06

## 2024-02-06 ENCOUNTER — APPOINTMENT (OUTPATIENT)
Dept: INFUSION THERAPY | Facility: HOSPITAL | Age: 64
End: 2024-02-06

## 2024-02-06 ENCOUNTER — APPOINTMENT (OUTPATIENT)
Dept: OTOLARYNGOLOGY | Facility: CLINIC | Age: 64
End: 2024-02-06
Payer: COMMERCIAL

## 2024-02-06 ENCOUNTER — APPOINTMENT (OUTPATIENT)
Dept: HEMATOLOGY ONCOLOGY | Facility: CLINIC | Age: 64
End: 2024-02-06
Payer: MEDICAID

## 2024-02-06 VITALS
BODY MASS INDEX: 20.87 KG/M2 | WEIGHT: 112 LBS | OXYGEN SATURATION: 99 % | SYSTOLIC BLOOD PRESSURE: 109 MMHG | HEIGHT: 61.54 IN | DIASTOLIC BLOOD PRESSURE: 74 MMHG | HEART RATE: 90 BPM

## 2024-02-06 LAB
ALBUMIN SERPL ELPH-MCNC: 4.4 G/DL — SIGNIFICANT CHANGE UP (ref 3.3–5)
ALP SERPL-CCNC: 126 U/L — HIGH (ref 40–120)
ALT FLD-CCNC: 20 U/L — SIGNIFICANT CHANGE UP (ref 10–45)
ANION GAP SERPL CALC-SCNC: 12 MMOL/L — SIGNIFICANT CHANGE UP (ref 5–17)
AST SERPL-CCNC: 42 U/L — HIGH (ref 10–40)
BASOPHILS # BLD AUTO: 0.06 K/UL — SIGNIFICANT CHANGE UP (ref 0–0.2)
BASOPHILS NFR BLD AUTO: 1 % — SIGNIFICANT CHANGE UP (ref 0–2)
BILIRUB SERPL-MCNC: 0.4 MG/DL — SIGNIFICANT CHANGE UP (ref 0.2–1.2)
BUN SERPL-MCNC: 10 MG/DL — SIGNIFICANT CHANGE UP (ref 7–23)
CALCIUM SERPL-MCNC: 9.3 MG/DL — SIGNIFICANT CHANGE UP (ref 8.4–10.5)
CHLORIDE SERPL-SCNC: 100 MMOL/L — SIGNIFICANT CHANGE UP (ref 96–108)
CO2 SERPL-SCNC: 25 MMOL/L — SIGNIFICANT CHANGE UP (ref 22–31)
CREAT SERPL-MCNC: 0.49 MG/DL — LOW (ref 0.5–1.3)
EGFR: 107 ML/MIN/1.73M2 — SIGNIFICANT CHANGE UP
EOSINOPHIL # BLD AUTO: 0.43 K/UL — SIGNIFICANT CHANGE UP (ref 0–0.5)
EOSINOPHIL NFR BLD AUTO: 7 % — HIGH (ref 0–6)
GLUCOSE SERPL-MCNC: 91 MG/DL — SIGNIFICANT CHANGE UP (ref 70–99)
HCT VFR BLD CALC: 37.1 % — SIGNIFICANT CHANGE UP (ref 34.5–45)
HGB BLD-MCNC: 13.2 G/DL — SIGNIFICANT CHANGE UP (ref 11.5–15.5)
IMM GRANULOCYTES NFR BLD AUTO: 0.6 % — SIGNIFICANT CHANGE UP (ref 0–0.9)
LYMPHOCYTES # BLD AUTO: 1.89 K/UL — SIGNIFICANT CHANGE UP (ref 1–3.3)
LYMPHOCYTES # BLD AUTO: 30.7 % — SIGNIFICANT CHANGE UP (ref 13–44)
MAGNESIUM SERPL-MCNC: 2 MG/DL — SIGNIFICANT CHANGE UP (ref 1.6–2.6)
MCHC RBC-ENTMCNC: 29.3 PG — SIGNIFICANT CHANGE UP (ref 27–34)
MCHC RBC-ENTMCNC: 35.6 G/DL — SIGNIFICANT CHANGE UP (ref 32–36)
MCV RBC AUTO: 82.4 FL — SIGNIFICANT CHANGE UP (ref 80–100)
MONOCYTES # BLD AUTO: 0.33 K/UL — SIGNIFICANT CHANGE UP (ref 0–0.9)
MONOCYTES NFR BLD AUTO: 5.4 % — SIGNIFICANT CHANGE UP (ref 2–14)
NEUTROPHILS # BLD AUTO: 3.41 K/UL — SIGNIFICANT CHANGE UP (ref 1.8–7.4)
NEUTROPHILS NFR BLD AUTO: 55.3 % — SIGNIFICANT CHANGE UP (ref 43–77)
NRBC # BLD: 0 /100 WBCS — SIGNIFICANT CHANGE UP (ref 0–0)
PLATELET # BLD AUTO: 259 K/UL — SIGNIFICANT CHANGE UP (ref 150–400)
POTASSIUM SERPL-MCNC: 3.9 MMOL/L — SIGNIFICANT CHANGE UP (ref 3.5–5.3)
POTASSIUM SERPL-SCNC: 3.9 MMOL/L — SIGNIFICANT CHANGE UP (ref 3.5–5.3)
PROT SERPL-MCNC: 7.3 G/DL — SIGNIFICANT CHANGE UP (ref 6–8.3)
RBC # BLD: 4.5 M/UL — SIGNIFICANT CHANGE UP (ref 3.8–5.2)
RBC # FLD: 12.8 % — SIGNIFICANT CHANGE UP (ref 10.3–14.5)
SODIUM SERPL-SCNC: 137 MMOL/L — SIGNIFICANT CHANGE UP (ref 135–145)
TSH SERPL-MCNC: 10.9 UIU/ML — HIGH (ref 0.27–4.2)
WBC # BLD: 6.16 K/UL — SIGNIFICANT CHANGE UP (ref 3.8–10.5)
WBC # FLD AUTO: 6.16 K/UL — SIGNIFICANT CHANGE UP (ref 3.8–10.5)

## 2024-02-06 PROCEDURE — 99213 OFFICE O/P EST LOW 20 MIN: CPT

## 2024-02-06 PROCEDURE — 31231 NASAL ENDOSCOPY DX: CPT

## 2024-02-06 PROCEDURE — 99214 OFFICE O/P EST MOD 30 MIN: CPT | Mod: 25

## 2024-02-06 NOTE — HISTORY OF PRESENT ILLNESS
[Disease: _____________________] : Disease: [unfilled] [AJCC Stage: ____] : AJCC Stage: [unfilled] [de-identified] : 63 year old female hx NPC and thyroid nodule bx with history of prior treatment with chemoRT in 2011, in 2017 she underwent a right parotidectomy for a recurrent NPC pathology was positive 1 LN positive for metatstic SCC, HPV positive.  She had a new parotid LN which on FNA was reported as nonkeratinizing SCCa and on pathology review is similar in morphology to her original disease. She underwent parotidectomy and upper neck dissection in October 2017. She has a thyroid isthmic nodule which on US had a small solid component. A FNA of this was performed which showed AUS ([place] III) but molecular testing was negative. On follow up US, the lesion has less of a cystic component and overall measures significantly smaller and remains stable from last US. Her imaging then reported a concerning node in the right parotid bed which showed new restricted diffusion and was suspicious for recurrent disease. This was confirmed with FNA and a subsequent PET/CT in 2021 essentially showed disease confined to the parotid bed. She underwent a revision parotidectomy and she is s/p this procedure with final pathology confirming the disease in one node in December 2021. Her most recent imaging in 2023 reported a new parotid node which was quite concerning and on exam she did indeed have a palpable node in the right parotid bed. A FNA was again consistent with recurrent disease and PET/CT showed this confined to the right parotid gland with a small secondary lesion deeper in the gland. She underwent a revsion of right parotidectomy she is s/p this procedure with intraoperatively this lesion was noted to be deep to the facial nerve and final pathology shows this to be consistent with recurrent disease with 7 negative LNs. She is healing well. She reports dysphagia postoperatively but grossly has intact function. She feels that she is improving.  Recent imaging are as follows:  MRI face/orbit/neck 09/22/23 New right parotid nodule strongly suspicious for recurrent tumor.  Patchy soft tissue thickening within the parotidectomy bed on the right similar in appearance likely representing post operative/post treatment changes.  No new cervical adenopathy.  9/2023 PET ct nodules in region of right parotid bed are compatible with metastatic disease. Two FDG-avid nodules in region of right parotid bed are compatible with metastatic disease. One nodule is new, and the other is increased in size and metabolism. A previously seen FDG-avid nodule in right parotid bed has been resected.  2. Mildly FDG-avid left level IB cervical lymph nodes are unchanged in size and metabolism. A benign etiology is favored.  3. No evidence of FDG avid distant metastasis.   Pathology report from post revision right parotidectomy on 11/1/2023 for susp of mets nodes, path Metastatic squamous cell carcinoma, non-keratinizing, involving fibroadipose tissue and lymphoid tissue, Seven lymph nodes negative for metastatic carcinoma ,In-situ hybridization for MINO performed on block 1C is negative. Previous tumor was HPV pos.  [de-identified] : sq cell ca [de-identified] : HPV (+) ,p40 (-) [FreeTextEntry1] : Weekly Erbitux with SBRT (5 fx) 2/6- [de-identified] : 2/6/24: Pt seen in treatment prior to start of treatment. Pt beginning CCRT with Erbitux for recurrent NPC. She is feeling well. No new complaints. Scheduled for 5fx SBRT to area of recurrence.

## 2024-02-06 NOTE — ASSESSMENT
[Future Reassessment of Pain Scale] : Future reassessment of pain scale    [Medication(s)] : Medication(s) [FreeTextEntry1] : 62 yo w with HPV positive NPC, multiple locoregional recurrences, S/P surgery, starting treatment with Erbitux/RT She previously received chemo (cisplatin) and RT  Erbitux #1 as scheduled today Still waiting for HPV testing from path as well as PDL1 and NGS Labs reviewed from  [employer] sent today to track tumor Pt will receive a total of 5 weekly doses of Erbitux.  Monitor electrolytes.  Reviewed possible skin toxicity with patient. Advised copious moisturizer and skin care.  OV 2 weeks.

## 2024-02-06 NOTE — PHYSICAL EXAM
[Fully active, able to carry on all pre-disease performance without restriction] : Status 0 - Fully active, able to carry on all pre-disease performance without restriction [Normal] : affect appropriate [de-identified] : scars from prior surgeries

## 2024-02-07 DIAGNOSIS — R11.2 NAUSEA WITH VOMITING, UNSPECIFIED: ICD-10-CM

## 2024-02-07 DIAGNOSIS — Z51.11 ENCOUNTER FOR ANTINEOPLASTIC CHEMOTHERAPY: ICD-10-CM

## 2024-02-07 LAB
HBV CORE AB SER-ACNC: SIGNIFICANT CHANGE UP
HBV SURFACE AB SER-ACNC: REACTIVE
HBV SURFACE AG SER-ACNC: SIGNIFICANT CHANGE UP
HCV AB S/CO SERPL IA: 0.1 S/CO — SIGNIFICANT CHANGE UP (ref 0–0.99)
HCV AB SERPL-IMP: SIGNIFICANT CHANGE UP

## 2024-02-12 ENCOUNTER — RESULT REVIEW (OUTPATIENT)
Age: 64
End: 2024-02-12

## 2024-02-12 ENCOUNTER — APPOINTMENT (OUTPATIENT)
Dept: HEMATOLOGY ONCOLOGY | Facility: CLINIC | Age: 64
End: 2024-02-12
Payer: MEDICAID

## 2024-02-12 ENCOUNTER — APPOINTMENT (OUTPATIENT)
Dept: INFUSION THERAPY | Facility: HOSPITAL | Age: 64
End: 2024-02-12

## 2024-02-12 ENCOUNTER — NON-APPOINTMENT (OUTPATIENT)
Age: 64
End: 2024-02-12

## 2024-02-12 DIAGNOSIS — E87.6 HYPOKALEMIA: ICD-10-CM

## 2024-02-12 DIAGNOSIS — R51.9 HEADACHE, UNSPECIFIED: ICD-10-CM

## 2024-02-12 LAB
ALBUMIN SERPL ELPH-MCNC: 4.2 G/DL — SIGNIFICANT CHANGE UP (ref 3.3–5)
ALP SERPL-CCNC: 110 U/L — SIGNIFICANT CHANGE UP (ref 40–120)
ALT FLD-CCNC: 17 U/L — SIGNIFICANT CHANGE UP (ref 10–45)
ANION GAP SERPL CALC-SCNC: 10 MMOL/L — SIGNIFICANT CHANGE UP (ref 5–17)
AST SERPL-CCNC: 21 U/L — SIGNIFICANT CHANGE UP (ref 10–40)
BASOPHILS # BLD AUTO: 0.03 K/UL — SIGNIFICANT CHANGE UP (ref 0–0.2)
BASOPHILS NFR BLD AUTO: 0.7 % — SIGNIFICANT CHANGE UP (ref 0–2)
BILIRUB SERPL-MCNC: 0.2 MG/DL — SIGNIFICANT CHANGE UP (ref 0.2–1.2)
BUN SERPL-MCNC: 10 MG/DL — SIGNIFICANT CHANGE UP (ref 7–23)
CALCIUM SERPL-MCNC: 9.2 MG/DL — SIGNIFICANT CHANGE UP (ref 8.4–10.5)
CHLORIDE SERPL-SCNC: 105 MMOL/L — SIGNIFICANT CHANGE UP (ref 96–108)
CO2 SERPL-SCNC: 28 MMOL/L — SIGNIFICANT CHANGE UP (ref 22–31)
CREAT SERPL-MCNC: 0.52 MG/DL — SIGNIFICANT CHANGE UP (ref 0.5–1.3)
EGFR: 106 ML/MIN/1.73M2 — SIGNIFICANT CHANGE UP
EOSINOPHIL # BLD AUTO: 0.21 K/UL — SIGNIFICANT CHANGE UP (ref 0–0.5)
EOSINOPHIL NFR BLD AUTO: 5.2 % — SIGNIFICANT CHANGE UP (ref 0–6)
GLUCOSE SERPL-MCNC: 136 MG/DL — HIGH (ref 70–99)
HCT VFR BLD CALC: 36.1 % — SIGNIFICANT CHANGE UP (ref 34.5–45)
HGB BLD-MCNC: 12.1 G/DL — SIGNIFICANT CHANGE UP (ref 11.5–15.5)
IMM GRANULOCYTES NFR BLD AUTO: 0.2 % — SIGNIFICANT CHANGE UP (ref 0–0.9)
LYMPHOCYTES # BLD AUTO: 1.08 K/UL — SIGNIFICANT CHANGE UP (ref 1–3.3)
LYMPHOCYTES # BLD AUTO: 26.9 % — SIGNIFICANT CHANGE UP (ref 13–44)
MAGNESIUM SERPL-MCNC: 1.9 MG/DL — SIGNIFICANT CHANGE UP (ref 1.6–2.6)
MCHC RBC-ENTMCNC: 29.2 PG — SIGNIFICANT CHANGE UP (ref 27–34)
MCHC RBC-ENTMCNC: 33.5 G/DL — SIGNIFICANT CHANGE UP (ref 32–36)
MCV RBC AUTO: 87 FL — SIGNIFICANT CHANGE UP (ref 80–100)
MONOCYTES # BLD AUTO: 0.26 K/UL — SIGNIFICANT CHANGE UP (ref 0–0.9)
MONOCYTES NFR BLD AUTO: 6.5 % — SIGNIFICANT CHANGE UP (ref 2–14)
NEUTROPHILS # BLD AUTO: 2.42 K/UL — SIGNIFICANT CHANGE UP (ref 1.8–7.4)
NEUTROPHILS NFR BLD AUTO: 60.5 % — SIGNIFICANT CHANGE UP (ref 43–77)
NRBC # BLD: 0 /100 WBCS — SIGNIFICANT CHANGE UP (ref 0–0)
PLATELET # BLD AUTO: 300 K/UL — SIGNIFICANT CHANGE UP (ref 150–400)
POTASSIUM SERPL-MCNC: 3.3 MMOL/L — LOW (ref 3.5–5.3)
POTASSIUM SERPL-SCNC: 3.3 MMOL/L — LOW (ref 3.5–5.3)
PROT SERPL-MCNC: 6.9 G/DL — SIGNIFICANT CHANGE UP (ref 6–8.3)
RBC # BLD: 4.15 M/UL — SIGNIFICANT CHANGE UP (ref 3.8–5.2)
RBC # FLD: 13.3 % — SIGNIFICANT CHANGE UP (ref 10.3–14.5)
SODIUM SERPL-SCNC: 143 MMOL/L — SIGNIFICANT CHANGE UP (ref 135–145)
WBC # BLD: 4.01 K/UL — SIGNIFICANT CHANGE UP (ref 3.8–10.5)
WBC # FLD AUTO: 4.01 K/UL — SIGNIFICANT CHANGE UP (ref 3.8–10.5)

## 2024-02-12 PROCEDURE — 99214 OFFICE O/P EST MOD 30 MIN: CPT

## 2024-02-12 RX ORDER — AMLODIPINE BESYLATE 2.5 MG/1
1 TABLET ORAL
Qty: 0 | Refills: 0 | DISCHARGE

## 2024-02-12 RX ORDER — POTASSIUM CHLORIDE 1500 MG/1
20 TABLET, FILM COATED, EXTENDED RELEASE ORAL
Qty: 10 | Refills: 0 | Status: ACTIVE | COMMUNITY
Start: 2024-02-12 | End: 1900-01-01

## 2024-02-12 RX ORDER — APREMILAST 10-20-30MG
1 KIT ORAL
Qty: 0 | Refills: 0 | DISCHARGE

## 2024-02-12 NOTE — PHYSICAL EXAM
[Fully active, able to carry on all pre-disease performance without restriction] : Status 0 - Fully active, able to carry on all pre-disease performance without restriction [Normal] : affect appropriate [de-identified] : +mild redness on right sided of face from radiation  [de-identified] : scars from prior surgeries

## 2024-02-12 NOTE — HISTORY OF PRESENT ILLNESS
[Disease: _____________________] : Disease: [unfilled] [AJCC Stage: ____] : AJCC Stage: [unfilled] [de-identified] : 63 year old female hx NPC and thyroid nodule bx with history of prior treatment with chemoRT in 2011, in 2017 she underwent a right parotidectomy for a recurrent NPC pathology was positive 1 LN positive for metatstic SCC, HPV positive.  She had a new parotid LN which on FNA was reported as nonkeratinizing SCCa and on pathology review is similar in morphology to her original disease. She underwent parotidectomy and upper neck dissection in October 2017. She has a thyroid isthmic nodule which on US had a small solid component. A FNA of this was performed which showed AUS ([place] III) but molecular testing was negative. On follow up US, the lesion has less of a cystic component and overall measures significantly smaller and remains stable from last US. Her imaging then reported a concerning node in the right parotid bed which showed new restricted diffusion and was suspicious for recurrent disease. This was confirmed with FNA and a subsequent PET/CT in 2021 essentially showed disease confined to the parotid bed. She underwent a revision parotidectomy and she is s/p this procedure with final pathology confirming the disease in one node in December 2021. Her most recent imaging in 2023 reported a new parotid node which was quite concerning and on exam she did indeed have a palpable node in the right parotid bed. A FNA was again consistent with recurrent disease and PET/CT showed this confined to the right parotid gland with a small secondary lesion deeper in the gland. She underwent a revsion of right parotidectomy she is s/p this procedure with intraoperatively this lesion was noted to be deep to the facial nerve and final pathology shows this to be consistent with recurrent disease with 7 negative LNs. She is healing well. She reports dysphagia postoperatively but grossly has intact function. She feels that she is improving.  Recent imaging are as follows:  MRI face/orbit/neck 09/22/23 New right parotid nodule strongly suspicious for recurrent tumor.  Patchy soft tissue thickening within the parotidectomy bed on the right similar in appearance likely representing post operative/post treatment changes.  No new cervical adenopathy.  9/2023 PET ct nodules in region of right parotid bed are compatible with metastatic disease. Two FDG-avid nodules in region of right parotid bed are compatible with metastatic disease. One nodule is new, and the other is increased in size and metabolism. A previously seen FDG-avid nodule in right parotid bed has been resected.  2. Mildly FDG-avid left level IB cervical lymph nodes are unchanged in size and metabolism. A benign etiology is favored.  3. No evidence of FDG avid distant metastasis.   Pathology report from post revision right parotidectomy on 11/1/2023 for susp of mets nodes, path Metastatic squamous cell carcinoma, non-keratinizing, involving fibroadipose tissue and lymphoid tissue, Seven lymph nodes negative for metastatic carcinoma ,In-situ hybridization for MINO performed on block 1C is negative. Previous tumor was HPV pos.   2/6/24: Pt seen in treatment prior to start of treatment. Pt beginning CCRT with Erbitux for recurrent NPC. She is feeling well. No new complaints. Scheduled for 5fx SBRT to area of recurrence.   [de-identified] : sq cell ca [de-identified] : HPV (+) ,p40 (-) [FreeTextEntry1] : Weekly Erbitux with SBRT (5 fx) 2/6- [de-identified] : 2/12/2024: here for week 2 of Erbitux. Reports that she has been having intolerable headaches after receiving first Erbitux last week. Her radiation started on 2/8/2024. She could not tolerate Tylenol but took Aleve 200mg 8-9 tablets per day, symptoms improved by Sunday. She concerns and ask if she can take any other pain med prn. Had low appetite several days after Erbitux but been better. No acneiform rashes yet. Denies constipation or diarrhea.

## 2024-02-12 NOTE — ASSESSMENT
[FreeTextEntry1] : 62 yo w with HPV positive NPC, multiple locoregional recurrences, S/P surgery, starting treatment with Erbitux/RT She previously received chemo (cisplatin) and RT -Erbitux week 2 as scheduled today -Still waiting for HPV testing from path as well as PDL1 and NGS -Navdex sent 2/6/2024  #hypokalemia -Mild with 3.3. Patient was informed and sent Rx of PO K 20meq BID x5 days.  #Headche may related to Erbitux,  -pt to try with Tramadol, can take Aleve in between as she cannot tolerate Tylenol   Patient was given the opportunity to ask questions. Her questions have been answered at this time with satisfaction.   Pt will receive a total of 5 weekly doses of Erbitux. Today week2/5  RTO 1 week

## 2024-02-12 NOTE — REVIEW OF SYSTEMS
[Dysphagia] : dysphagia [Diarrhea: Grade 0] : Diarrhea: Grade 0 [Confused] : no confusion [Dizziness] : dizziness [Fainting] : no fainting [Difficulty Walking] : no difficulty walking [Negative] : Allergic/Immunologic [FreeTextEntry4] : as above [de-identified] : +headache

## 2024-02-12 NOTE — H&P PST ADULT - NEGATIVE SKIN SYMPTOMS
Left message for patient to return call.  Maddie Hartman MA 2/12/2024    
Please inform patient of lab results, which are all within acceptable ranges.
no rash/no itching/no brittle nails/no pitted nails

## 2024-02-13 LAB — TSH SERPL-MCNC: 2.8 UIU/ML — SIGNIFICANT CHANGE UP (ref 0.27–4.2)

## 2024-02-14 ENCOUNTER — OUTPATIENT (OUTPATIENT)
Dept: OUTPATIENT SERVICES | Facility: HOSPITAL | Age: 64
LOS: 1 days | Discharge: HOME | End: 2024-02-14

## 2024-02-14 DIAGNOSIS — C11.9 MALIGNANT NEOPLASM OF NASOPHARYNX, UNSPECIFIED: ICD-10-CM

## 2024-02-14 DIAGNOSIS — Z90.49 ACQUIRED ABSENCE OF OTHER SPECIFIED PARTS OF DIGESTIVE TRACT: Chronic | ICD-10-CM

## 2024-02-14 DIAGNOSIS — Z98.89 OTHER SPECIFIED POSTPROCEDURAL STATES: Chronic | ICD-10-CM

## 2024-02-20 ENCOUNTER — APPOINTMENT (OUTPATIENT)
Dept: INFUSION THERAPY | Facility: HOSPITAL | Age: 64
End: 2024-02-20

## 2024-02-20 ENCOUNTER — RESULT REVIEW (OUTPATIENT)
Age: 64
End: 2024-02-20

## 2024-02-20 LAB
BASOPHILS # BLD AUTO: 0.04 K/UL — SIGNIFICANT CHANGE UP (ref 0–0.2)
BASOPHILS NFR BLD AUTO: 0.6 % — SIGNIFICANT CHANGE UP (ref 0–2)
EOSINOPHIL # BLD AUTO: 0.4 K/UL — SIGNIFICANT CHANGE UP (ref 0–0.5)
EOSINOPHIL NFR BLD AUTO: 6 % — SIGNIFICANT CHANGE UP (ref 0–6)
HCT VFR BLD CALC: 39.3 % — SIGNIFICANT CHANGE UP (ref 34.5–45)
HGB BLD-MCNC: 13.3 G/DL — SIGNIFICANT CHANGE UP (ref 11.5–15.5)
IMM GRANULOCYTES NFR BLD AUTO: 0.3 % — SIGNIFICANT CHANGE UP (ref 0–0.9)
LYMPHOCYTES # BLD AUTO: 1.2 K/UL — SIGNIFICANT CHANGE UP (ref 1–3.3)
LYMPHOCYTES # BLD AUTO: 18 % — SIGNIFICANT CHANGE UP (ref 13–44)
MCHC RBC-ENTMCNC: 29.3 PG — SIGNIFICANT CHANGE UP (ref 27–34)
MCHC RBC-ENTMCNC: 33.8 G/DL — SIGNIFICANT CHANGE UP (ref 32–36)
MCV RBC AUTO: 86.6 FL — SIGNIFICANT CHANGE UP (ref 80–100)
MONOCYTES # BLD AUTO: 0.32 K/UL — SIGNIFICANT CHANGE UP (ref 0–0.9)
MONOCYTES NFR BLD AUTO: 4.8 % — SIGNIFICANT CHANGE UP (ref 2–14)
NEUTROPHILS # BLD AUTO: 4.7 K/UL — SIGNIFICANT CHANGE UP (ref 1.8–7.4)
NEUTROPHILS NFR BLD AUTO: 70.3 % — SIGNIFICANT CHANGE UP (ref 43–77)
NRBC # BLD: 0 /100 WBCS — SIGNIFICANT CHANGE UP (ref 0–0)
PLATELET # BLD AUTO: 334 K/UL — SIGNIFICANT CHANGE UP (ref 150–400)
RBC # BLD: 4.54 M/UL — SIGNIFICANT CHANGE UP (ref 3.8–5.2)
RBC # FLD: 13.1 % — SIGNIFICANT CHANGE UP (ref 10.3–14.5)
WBC # BLD: 6.68 K/UL — SIGNIFICANT CHANGE UP (ref 3.8–10.5)
WBC # FLD AUTO: 6.68 K/UL — SIGNIFICANT CHANGE UP (ref 3.8–10.5)

## 2024-02-20 RX ORDER — CLINDAMYCIN PHOSPHATE 10 MG/ML
1 LOTION TOPICAL TWICE DAILY
Qty: 2 | Refills: 6 | Status: ACTIVE | COMMUNITY
Start: 2024-02-20 | End: 1900-01-01

## 2024-02-20 NOTE — REVIEW OF SYSTEMS
[Dysphagia: Grade 0] : Dysphagia: Grade 0 [Fatigue: Grade 1 - Fatigue relieved by rest] : Fatigue: Grade 1 - Fatigue relieved by rest [Mucositis Oral: Grade 1 - Asymptomatic or mild symptoms; intervention not indicated] : Mucositis Oral: Grade 1 - Asymptomatic or mild symptoms; intervention not indicated [Xerostomia: Grade 0] : Xerostomia: Grade 0 [Oral Pain: Grade 0] : Oral Pain: Grade 0 [Dysgeusia: Grade 1- Altered taste but no change in diet] : Dysgeusia: Grade 1 - Altered taste but no change in diet [Skin Hyperpigmentation: Grade 0] : Skin Hyperpigmentation: Grade 0 [Dermatitis Radiation: Grade 1 - Faint erythema or dry desquamation] : Dermatitis Radiation: Grade 1 - Faint erythema or dry desquamation

## 2024-02-20 NOTE — DISEASE MANAGEMENT
[Clinical] : TNM Stage: c [TTNM] : Rec [NTNM] : 0 [MTNM] : 0 [N/A] : Currently not applicable [de-identified] : 2169 [de-identified] : [id number] [de-identified] : Right parotid

## 2024-02-20 NOTE — HISTORY OF PRESENT ILLNESS
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  She had a new parotid LN which on FNA was reported as nonkeratinizing SCCa and on pathology review is similar in morphology to her original disease.  She underwent parotidectomy and upper neck dissection in October 2017.  She has a thyroid isthmic nodule which on US had a small solid component.  A FNA of this was performed which showed AUS ([place] III) but molecular testing was negative.  On followup US, the lesion has less of a cystic component and overall measures significantly smaller and remains stable from last US.  Her imaging then reported a concerning node in the right parotid bed which showed new restricted diffusion and was suspicious for recurrent disease.  This was confirmed with FNA and a subsequent PET/CT in 2021 essentially showed disease confined to the parotid bed.  She underwent a  revision parotidectomy and she is s/p this procedure with final pathology confirming the disease in one node in December 2021.  Her most recent imaging in 2023  reported a new parotid node which was quite concerning and on exam she did indeed have a palpable node in the right parotid bed.  A FNA was again consistent with recurrent disease and PET/CT showed this confined to the right parotid gland with a small secondary lesion deeper in the gland. She underwent a revsion of right parotidectomy she is s/p this procedure with intraoperatively this lesion was noted to be deep to the facial nerve and final pathology shows this to be consistent with recurrent disease with 7 negative LNs.  She is healing well.  She reports dysphagia postoperatively but grossly has intact function.  She feels that she is improving.    MRI face/orbit 09/20/21 Enlarging right parotid nodule measuring up to 1.1 cm with new associated restricted diffusion, suspicious for recurrent/metastatic disease.  MRI orbit/ace/neck 11 apr 2022 Interval resection of lesion in the right parotid region. Interval development of ill-defined area of enhancement measuring up to 2 cm in the right parotid bed, which could represent postsurgical changes, but neoplasm cannot be ruled out. Recommend short interval follow-up versus tissue sampling.  -Increasing size of left level Ia node, now measuring up to 1.6 cm, nonspecific. Recommend short interval follow-up versus tissue sampling.  MRI face/orbit/neck 09/22/23 New right parotid nodule strongly suspicious for recurrent tumor.  Patchy soft tissue thickening within the parotidectomy bed on the right similar in appearance likely representing post operative/post treatment changes.  No new cervical adenopathy.  9/2023 PET ct nodules in region of right parotid bed are compatible with metastatic disease. Two FDG-avid nodules in region of right parotid bed are compatible with metastatic disease. One nodule is new, and the other is increased in size and metabolism. A previously seen FDG-avid nodule in right parotid bed has been resected.  2. Mildly FDG-avid left level IB cervical lymph nodes are unchanged in size and metabolism. A benign etiology is favored.  3. No evidence of FDG avid distant metastasis.  Pathology report from post revision right parotidectomy on 11/1/2023 for susp of mets nodes, path Metastatic squamous cell carcinoma, non-keratinizing, involving fibroadipose tissue and lymphoid tissue, Seven lymph nodes negative for metastatic carcinoma ,In-situ hybridization for MINO performed on block 1C is negative  2/20/2024 Presents for OTV. Completed fx 4/5 to right parotid. Having mild throat pain. Magic mouthwash e-scribed.

## 2024-02-20 NOTE — VITALS
[Maximal Pain Intensity: 3/10] : 3/10 [Least Pain Intensity: 0/10] : 0/10 [Pain Description/Quality: ___] : Pain description/quality: [unfilled] [Pain Duration: ___] : Pain duration: [unfilled] [Pain Location: ___] : Pain Location: [unfilled] [Pain Interferes with ADLs] : Pain does not interfere with activities of daily living [80: Normal activity with effort; some signs or symptoms of disease.] : 80: Normal activity with effort; some signs or symptoms of disease.  [ECOG Performance Status: 1 - Restricted in physically strenuous activity but ambulatory and able to carry out work of a light or sedentary nature] : Performance Status: 1 - Restricted in physically strenuous activity but ambulatory and able to carry out work of a light or sedentary nature, e.g., light house work, office work

## 2024-02-21 DIAGNOSIS — R11.2 NAUSEA WITH VOMITING, UNSPECIFIED: ICD-10-CM

## 2024-02-21 DIAGNOSIS — Z51.11 ENCOUNTER FOR ANTINEOPLASTIC CHEMOTHERAPY: ICD-10-CM

## 2024-02-21 LAB
ALBUMIN SERPL ELPH-MCNC: 4.6 G/DL — SIGNIFICANT CHANGE UP (ref 3.3–5)
ALP SERPL-CCNC: 131 U/L — HIGH (ref 40–120)
ALT FLD-CCNC: 23 U/L — SIGNIFICANT CHANGE UP (ref 10–45)
ANION GAP SERPL CALC-SCNC: 18 MMOL/L — HIGH (ref 5–17)
AST SERPL-CCNC: 26 U/L — SIGNIFICANT CHANGE UP (ref 10–40)
BILIRUB SERPL-MCNC: 0.3 MG/DL — SIGNIFICANT CHANGE UP (ref 0.2–1.2)
BUN SERPL-MCNC: 9 MG/DL — SIGNIFICANT CHANGE UP (ref 7–23)
CALCIUM SERPL-MCNC: 9.8 MG/DL — SIGNIFICANT CHANGE UP (ref 8.4–10.5)
CHLORIDE SERPL-SCNC: 102 MMOL/L — SIGNIFICANT CHANGE UP (ref 96–108)
CO2 SERPL-SCNC: 21 MMOL/L — LOW (ref 22–31)
CREAT SERPL-MCNC: 0.55 MG/DL — SIGNIFICANT CHANGE UP (ref 0.5–1.3)
EGFR: 104 ML/MIN/1.73M2 — SIGNIFICANT CHANGE UP
GLUCOSE SERPL-MCNC: 80 MG/DL — SIGNIFICANT CHANGE UP (ref 70–99)
MAGNESIUM SERPL-MCNC: 2.2 MG/DL — SIGNIFICANT CHANGE UP (ref 1.6–2.6)
POTASSIUM SERPL-MCNC: 4.2 MMOL/L — SIGNIFICANT CHANGE UP (ref 3.5–5.3)
POTASSIUM SERPL-SCNC: 4.2 MMOL/L — SIGNIFICANT CHANGE UP (ref 3.5–5.3)
PROT SERPL-MCNC: 8.2 G/DL — SIGNIFICANT CHANGE UP (ref 6–8.3)
SODIUM SERPL-SCNC: 141 MMOL/L — SIGNIFICANT CHANGE UP (ref 135–145)
TSH SERPL-MCNC: 3.46 UIU/ML — SIGNIFICANT CHANGE UP (ref 0.27–4.2)

## 2024-02-22 PROCEDURE — 77435 SBRT MANAGEMENT: CPT

## 2024-02-26 ENCOUNTER — APPOINTMENT (OUTPATIENT)
Dept: INFUSION THERAPY | Facility: HOSPITAL | Age: 64
End: 2024-02-26

## 2024-02-26 ENCOUNTER — APPOINTMENT (OUTPATIENT)
Dept: HEMATOLOGY ONCOLOGY | Facility: CLINIC | Age: 64
End: 2024-02-26

## 2024-02-29 ENCOUNTER — APPOINTMENT (OUTPATIENT)
Dept: INFUSION THERAPY | Facility: HOSPITAL | Age: 64
End: 2024-02-29

## 2024-03-01 DIAGNOSIS — E86.0 DEHYDRATION: ICD-10-CM

## 2024-03-05 ENCOUNTER — APPOINTMENT (OUTPATIENT)
Dept: HEMATOLOGY ONCOLOGY | Facility: CLINIC | Age: 64
End: 2024-03-05

## 2024-03-05 ENCOUNTER — APPOINTMENT (OUTPATIENT)
Dept: INFUSION THERAPY | Facility: HOSPITAL | Age: 64
End: 2024-03-05

## 2024-03-06 NOTE — PHYSICAL EXAM
[Nasal Endoscopy Performed] : nasal endoscopy was performed, see procedure section for findings [Midline] : trachea located in midline position [Normal] : no rashes [de-identified] : Posttreatment changes, incision healing well, no LAD. [de-identified] : Psoriactic changes along the conchal bowl AS.  [de-identified] : Slight weakness of the marginal nerve on the right.

## 2024-03-06 NOTE — PROCEDURE
[Dysphagia] : dysphagia not clearly evaluated by indirect laryngoscopy [None] : none [Flexible Endoscope] : examined with the flexible endoscope [Serial Number: ___] : Serial Number: [unfilled] [FreeTextEntry6] : No lesions in the NC or NPx. Posttreatment changes are noted. No evidence of purulence from the OMCs.  [de-identified] : NPC [de-identified] : No lesions in the NPx, OPx, HPx or larynx.  Expected posttreatment changes, VC are mobile, airway patent.

## 2024-03-06 NOTE — HISTORY OF PRESENT ILLNESS
[de-identified] : 63 year old female hx NPC and thyroid nodule bx benign and presents for post operative visit s/p revision right parotidectomy on 12/1/2021.  CT chest and MRI on  7/3/2023 concern of  New right parotid nodule strongly suspicious for recurrent tumor.  No new cervical adenopathy. 8/15/2023 BX BIOPSY AND FNA POSITIVE FOR MALIGNANT CELLS. 9/2023 PET ct  nodules in region of right parotid bed are compatible with metastatic disease.  Pt is post revision right parotidectomy on 11/1/2023 for susp of mets nodes, path Metastatic squamous cell carcinoma, non-keratinizing, involving fibroadipose tissue and lymphoid tissue, Seven lymph nodes negative for metastatic carcinoma ,In-situ hybridization for MINO performed on block 1C is negative PT started chemo today with Dr. Nieves and Dr. Harley for Rt 2/8/2024 last ct of neck and chest 1/4/2024. Doing well since last visit. Reports having some difficulty swallowing solids, swallowing liquids has improved. Has to have liquids with her to swallow solids. Patient denies odynophagia, dyspnea, hemoptysis, dysphonia or otalgia Denies recent fevers/infections, chills, night sweats, weight loss.

## 2024-03-06 NOTE — REASON FOR VISIT
[Subsequent Evaluation] : a subsequent evaluation for [FreeTextEntry2] : s/p revision right parotidectomy

## 2024-03-19 ENCOUNTER — APPOINTMENT (OUTPATIENT)
Dept: OTOLARYNGOLOGY | Facility: CLINIC | Age: 64
End: 2024-03-19
Payer: MEDICAID

## 2024-03-19 VITALS
DIASTOLIC BLOOD PRESSURE: 79 MMHG | WEIGHT: 112 LBS | HEIGHT: 61.54 IN | SYSTOLIC BLOOD PRESSURE: 117 MMHG | BODY MASS INDEX: 20.87 KG/M2 | OXYGEN SATURATION: 100 % | HEART RATE: 79 BPM

## 2024-03-19 PROCEDURE — 31231 NASAL ENDOSCOPY DX: CPT

## 2024-03-19 PROCEDURE — 99214 OFFICE O/P EST MOD 30 MIN: CPT | Mod: 25

## 2024-03-19 NOTE — PROCEDURE
[None] : none [Dysphagia] : dysphagia not clearly evaluated by indirect laryngoscopy [Serial Number: ___] : Serial Number: [unfilled] [Flexible Endoscope] : examined with the flexible endoscope [FreeTextEntry6] : No lesions in the NC or NPx. Posttreatment changes are noted. No evidence of purulence from the OMCs.  [de-identified] : NPC [de-identified] : No lesions in the NPx, OPx, HPx or larynx.  Expected posttreatment changes, VC are mobile, airway patent.

## 2024-03-19 NOTE — PHYSICAL EXAM
[Nasal Endoscopy Performed] : nasal endoscopy was performed, see procedure section for findings [Midline] : trachea located in midline position [Normal] : no rashes [de-identified] : Posttreatment changes, incision healing well, no LAD.  Significant dermatitis involving the neck and face.   [de-identified] : Psoriactic changes along the conchal bowl AS.  [de-identified] : Slight weakness of the marginal nerve on the right.

## 2024-03-19 NOTE — HISTORY OF PRESENT ILLNESS
[de-identified] : 63 year old female with history of NPC and benign thyroid nodule. s/p revision right parotidectomy on 12/1/2021. CT chest and MRI on 7/3/2023 concern of new right parotid nodule strongly suspicious for recurrent tumor. No new cervical adenopathy. 8/15/2023 BX BIOPSY AND FNA POSITIVE FOR MALIGNANT CELLS. 9/2023 PET nodules in region of right parotid bed are compatible with metastatic disease.   Patient is now post revision right parotidectomy on 11/1/2023 for susp of mets nodes, path c/w Metastatic SCCa, non-keratinizing, involving fibroadipose tissue and lymphoid tissue, Seven lymph nodes negative for metastatic carcinoma ,In-situ hybridization for MINO performed on block 1C is negative. Received 3 out of 5 chemo treatments. Completed 2/23/24. Continues to f/u with Dr. Nieves and Dr. Harley. Reports oral and tongue pain. Notes oral dryness. Tolerating thin liquids soft foods. Reports 4-5 pound weight loss.

## 2024-03-28 ENCOUNTER — APPOINTMENT (OUTPATIENT)
Dept: HEMATOLOGY ONCOLOGY | Facility: CLINIC | Age: 64
End: 2024-03-28
Payer: MEDICAID

## 2024-03-28 VITALS
BODY MASS INDEX: 20.42 KG/M2 | HEART RATE: 96 BPM | SYSTOLIC BLOOD PRESSURE: 108 MMHG | RESPIRATION RATE: 16 BRPM | OXYGEN SATURATION: 99 % | WEIGHT: 110 LBS | TEMPERATURE: 98.1 F | DIASTOLIC BLOOD PRESSURE: 75 MMHG

## 2024-03-28 DIAGNOSIS — K11.9 DISEASE OF SALIVARY GLAND, UNSPECIFIED: ICD-10-CM

## 2024-03-28 PROCEDURE — G2211 COMPLEX E/M VISIT ADD ON: CPT | Mod: NC,1L

## 2024-03-28 PROCEDURE — 99214 OFFICE O/P EST MOD 30 MIN: CPT

## 2024-03-28 NOTE — HISTORY OF PRESENT ILLNESS
[Disease: _____________________] : Disease: [unfilled] [AJCC Stage: ____] : AJCC Stage: [unfilled] [de-identified] : 63 year old female hx NPC and thyroid nodule bx with history of prior treatment with chemoRT in 2011, in 2017 she underwent a right parotidectomy for a recurrent NPC pathology was positive 1 LN positive for metatstic SCC, HPV positive.  She had a new parotid LN which on FNA was reported as nonkeratinizing SCCa and on pathology review is similar in morphology to her original disease. She underwent parotidectomy and upper neck dissection in October 2017. She has a thyroid isthmic nodule which on US had a small solid component. A FNA of this was performed which showed AUS ([place] III) but molecular testing was negative. On follow up US, the lesion has less of a cystic component and overall measures significantly smaller and remains stable from last US. Her imaging then reported a concerning node in the right parotid bed which showed new restricted diffusion and was suspicious for recurrent disease. This was confirmed with FNA and a subsequent PET/CT in 2021 essentially showed disease confined to the parotid bed. She underwent a revision parotidectomy and she is s/p this procedure with final pathology confirming the disease in one node in December 2021. Her most recent imaging in 2023 reported a new parotid node which was quite concerning and on exam she did indeed have a palpable node in the right parotid bed. A FNA was again consistent with recurrent disease and PET/CT showed this confined to the right parotid gland with a small secondary lesion deeper in the gland. She underwent a revsion of right parotidectomy she is s/p this procedure with intraoperatively this lesion was noted to be deep to the facial nerve and final pathology shows this to be consistent with recurrent disease with 7 negative LNs. She is healing well. She reports dysphagia postoperatively but grossly has intact function. She feels that she is improving.  Recent imaging are as follows:  MRI face/orbit/neck 09/22/23 New right parotid nodule strongly suspicious for recurrent tumor.  Patchy soft tissue thickening within the parotidectomy bed on the right similar in appearance likely representing post operative/post treatment changes.  No new cervical adenopathy.  9/2023 PET ct nodules in region of right parotid bed are compatible with metastatic disease. Two FDG-avid nodules in region of right parotid bed are compatible with metastatic disease. One nodule is new, and the other is increased in size and metabolism. A previously seen FDG-avid nodule in right parotid bed has been resected.  2. Mildly FDG-avid left level IB cervical lymph nodes are unchanged in size and metabolism. A benign etiology is favored.  3. No evidence of FDG avid distant metastasis.   Pathology report from post revision right parotidectomy on 11/1/2023 for susp of mets nodes, path Metastatic squamous cell carcinoma, non-keratinizing, involving fibroadipose tissue and lymphoid tissue, Seven lymph nodes negative for metastatic carcinoma ,In-situ hybridization for MINO performed on block 1C is negative. Previous tumor was HPV pos.  [de-identified] : HPV (+) ,p40 (-) [de-identified] : sq cell ca [de-identified] : 2/6/24: Pt seen in treatment prior to start of treatment. Pt beginning CCRT with Erbitux for recurrent NPC. She is feeling well. No new complaints. Scheduled for 5fx SBRT to area of recurrence.    3/28/24: Pt completed SBRT. She only received 2 cycles of Erbitux due to severe AEs with skin rash, redness, and pain.  [FreeTextEntry1] : Weekly Erbitux with SBRT (5 fx) 2/6-

## 2024-03-28 NOTE — ASSESSMENT
[FreeTextEntry1] : 62 yo w with HPV positive NPC, multiple locoregional recurrences, S/P surgery, starting treatment with Erbitux/RT She previously received chemo (cisplatin) and RT She was recommended SBRT with Erbitux She completed SBRT, but could not tolerate Erbitux and stopped after 2 weekly tx  Still waiting for HPV testing from path as well as PDL1 and NGS Labs reviewed from Feb 20 reviewed. WNL Will repeat today Navdex results pending.  Reviewed possible skin care with patient. Advised copious moisturizer and skin care.  Avoid cosmetics for a few more weeks Scans in May as per Dr. Hernandez OV in 2 months.    [Future Reassessment of Pain Scale] : Future reassessment of pain scale    [Medication(s)] : Medication(s)

## 2024-03-28 NOTE — REVIEW OF SYSTEMS
[Dysphagia] : dysphagia [Skin Rash] : skin rash [Diarrhea: Grade 0] : Diarrhea: Grade 0 [Negative] : Allergic/Immunologic [FreeTextEntry4] : as above

## 2024-03-28 NOTE — PHYSICAL EXAM
[Fully active, able to carry on all pre-disease performance without restriction] : Status 0 - Fully active, able to carry on all pre-disease performance without restriction [Normal] : affect appropriate [de-identified] : facial redness and dark discoloration over back and pelvis [de-identified] : scars from prior surgeries

## 2024-04-03 ENCOUNTER — APPOINTMENT (OUTPATIENT)
Dept: RADIATION ONCOLOGY | Facility: CLINIC | Age: 64
End: 2024-04-03
Payer: MEDICAID

## 2024-04-03 VITALS
RESPIRATION RATE: 16 BRPM | SYSTOLIC BLOOD PRESSURE: 100 MMHG | DIASTOLIC BLOOD PRESSURE: 67 MMHG | WEIGHT: 112.43 LBS | HEART RATE: 80 BPM | TEMPERATURE: 96.3 F | OXYGEN SATURATION: 98 % | BODY MASS INDEX: 20.87 KG/M2

## 2024-04-03 PROCEDURE — 99213 OFFICE O/P EST LOW 20 MIN: CPT

## 2024-04-03 RX ORDER — HYDROCORTISONE 25 MG/G
2.5 OINTMENT TOPICAL
Qty: 1 | Refills: 0 | Status: ACTIVE | COMMUNITY
Start: 2024-04-03 | End: 1900-01-01

## 2024-04-03 RX ORDER — HYDROCORTISONE 25 MG/G
2.5 CREAM TOPICAL
Qty: 1 | Refills: 1 | Status: ACTIVE | COMMUNITY
Start: 2024-04-03 | End: 1900-01-01

## 2024-04-03 RX ORDER — DIPHENHYDRAMINE HYDROCHLORIDE AND LIDOCAINE HYDROCHLORIDE AND ALUMINUM HYDROXIDE AND MAGNESIUM HYDRO
KIT
Qty: 900 | Refills: 3 | Status: DISCONTINUED | COMMUNITY
Start: 2024-02-20 | End: 2024-04-03

## 2024-04-03 RX ORDER — TRAMADOL HYDROCHLORIDE 50 MG/1
50 TABLET, COATED ORAL TWICE DAILY
Qty: 14 | Refills: 0 | Status: DISCONTINUED | COMMUNITY
Start: 2024-02-12 | End: 2024-04-03

## 2024-04-03 NOTE — REVIEW OF SYSTEMS
[Dysphagia: Grade 0] : Dysphagia: Grade 0 [Fatigue: Grade 1 - Fatigue relieved by rest] : Fatigue: Grade 1 - Fatigue relieved by rest [Oral Pain: Grade 0] : Oral Pain: Grade 0 [Skin Hyperpigmentation: Grade 0] : Skin Hyperpigmentation: Grade 0 [Mucositis Oral: Grade 0] : Mucositis Oral: Grade 0  [Xerostomia: Grade 1 - Symptomatic (e.g., dry or thick saliva) without significant dietary alteration; unstimulated saliva flow >0.2 ml/min] : Xerostomia: Grade 1 - Symptomatic (e.g., dry or thick saliva) without significant dietary alteration; unstimulated saliva flow >0.2 ml/min [Dysgeusia: Grade 0] : Dysgeusia: Grade 0 [Dermatitis Radiation: Grade 2 - Moderate to brisk erythema; patchy moist desquamation, mostly confined to skin folds and creases; moderate edema] : Dermatitis Radiation: Grade 2 - Moderate to brisk erythema; patchy moist desquamation, mostly confined to skin folds and creases; moderate edema

## 2024-04-10 NOTE — VITALS
[Least Pain Intensity: 0/10] : 0/10 [80: Normal activity with effort; some signs or symptoms of disease.] : 80: Normal activity with effort; some signs or symptoms of disease.  [0 - No Distress] : Distress Level: 0 [ECOG Performance Status: 1 - Restricted in physically strenuous activity but ambulatory and able to carry out work of a light or sedentary nature] : Performance Status: 1 - Restricted in physically strenuous activity but ambulatory and able to carry out work of a light or sedentary nature, e.g., light house work, office work [Maximal Pain Intensity: 0/10] : 0/10 [Pain Interferes with ADLs] : Pain does not interfere with activities of daily living

## 2024-04-10 NOTE — HISTORY OF PRESENT ILLNESS
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  She is s/p SBRT 5fx to right parotid 2/22/2024  4/3/2024: PTE Notes slight improvement in radiation side effects. Using aquaphor for pruritus treated site. Appetite good. Weight stable.

## 2024-04-10 NOTE — PHYSICAL EXAM
[Normal] : well developed, well nourished, in no acute distress [Hearing Threshold Finger Rub Not [place]] : hearing was normal [] : no respiratory distress [Oriented To Time, Place, And Person] : oriented to person, place, and time [de-identified] : no lesions [de-identified] : swelling neck [de-identified] :  moderate erythema to treated  site

## 2024-04-25 VITALS
RESPIRATION RATE: 17 BRPM | HEART RATE: 72 BPM | TEMPERATURE: 96.98 F | OXYGEN SATURATION: 100 % | SYSTOLIC BLOOD PRESSURE: 112 MMHG | DIASTOLIC BLOOD PRESSURE: 74 MMHG | WEIGHT: 112 LBS | BODY MASS INDEX: 21.14 KG/M2 | HEIGHT: 61 IN

## 2024-05-10 ENCOUNTER — OUTPATIENT (OUTPATIENT)
Dept: OUTPATIENT SERVICES | Facility: HOSPITAL | Age: 64
LOS: 1 days | End: 2024-05-10
Payer: MEDICAID

## 2024-05-10 ENCOUNTER — APPOINTMENT (OUTPATIENT)
Dept: CT IMAGING | Facility: CLINIC | Age: 64
End: 2024-05-10
Payer: MEDICAID

## 2024-05-10 ENCOUNTER — APPOINTMENT (OUTPATIENT)
Dept: MRI IMAGING | Facility: CLINIC | Age: 64
End: 2024-05-10
Payer: MEDICAID

## 2024-05-10 DIAGNOSIS — C11.9 MALIGNANT NEOPLASM OF NASOPHARYNX, UNSPECIFIED: ICD-10-CM

## 2024-05-10 DIAGNOSIS — Z98.89 OTHER SPECIFIED POSTPROCEDURAL STATES: Chronic | ICD-10-CM

## 2024-05-10 DIAGNOSIS — Z90.49 ACQUIRED ABSENCE OF OTHER SPECIFIED PARTS OF DIGESTIVE TRACT: Chronic | ICD-10-CM

## 2024-05-10 DIAGNOSIS — Z00.8 ENCOUNTER FOR OTHER GENERAL EXAMINATION: ICD-10-CM

## 2024-05-10 DIAGNOSIS — C77.0 SECONDARY AND UNSPECIFIED MALIGNANT NEOPLASM OF LYMPH NODES OF HEAD, FACE AND NECK: ICD-10-CM

## 2024-05-10 PROCEDURE — 70542 MRI ORBIT/FACE/NECK W/DYE: CPT | Mod: 26

## 2024-05-10 PROCEDURE — 71260 CT THORAX DX C+: CPT | Mod: 26

## 2024-05-10 PROCEDURE — 71260 CT THORAX DX C+: CPT

## 2024-05-10 PROCEDURE — A9585: CPT

## 2024-05-10 PROCEDURE — 70542 MRI ORBIT/FACE/NECK W/DYE: CPT

## 2024-05-16 ENCOUNTER — OUTPATIENT (OUTPATIENT)
Dept: OUTPATIENT SERVICES | Facility: HOSPITAL | Age: 64
LOS: 1 days | Discharge: HOME | End: 2024-05-16

## 2024-05-16 DIAGNOSIS — Z90.49 ACQUIRED ABSENCE OF OTHER SPECIFIED PARTS OF DIGESTIVE TRACT: Chronic | ICD-10-CM

## 2024-05-16 DIAGNOSIS — C11.9 MALIGNANT NEOPLASM OF NASOPHARYNX, UNSPECIFIED: ICD-10-CM

## 2024-05-16 DIAGNOSIS — Z98.89 OTHER SPECIFIED POSTPROCEDURAL STATES: Chronic | ICD-10-CM

## 2024-05-23 ENCOUNTER — APPOINTMENT (OUTPATIENT)
Dept: HEMATOLOGY ONCOLOGY | Facility: CLINIC | Age: 64
End: 2024-05-23

## 2024-05-31 ENCOUNTER — APPOINTMENT (OUTPATIENT)
Dept: OTOLARYNGOLOGY | Facility: CLINIC | Age: 64
End: 2024-05-31
Payer: MEDICAID

## 2024-05-31 VITALS
HEART RATE: 70 BPM | OXYGEN SATURATION: 98 % | DIASTOLIC BLOOD PRESSURE: 75 MMHG | SYSTOLIC BLOOD PRESSURE: 116 MMHG | HEIGHT: 63 IN | WEIGHT: 105 LBS | BODY MASS INDEX: 18.61 KG/M2

## 2024-05-31 DIAGNOSIS — L30.9 DERMATITIS, UNSPECIFIED: ICD-10-CM

## 2024-05-31 DIAGNOSIS — C11.9 MALIGNANT NEOPLASM OF NASOPHARYNX, UNSPECIFIED: ICD-10-CM

## 2024-05-31 DIAGNOSIS — C77.0 SECONDARY AND UNSPECIFIED MALIGNANT NEOPLASM OF LYMPH NODES OF HEAD, FACE AND NECK: ICD-10-CM

## 2024-05-31 DIAGNOSIS — L40.9 PSORIASIS, UNSPECIFIED: ICD-10-CM

## 2024-05-31 DIAGNOSIS — H69.93 UNSPECIFIED EUSTACHIAN TUBE DISORDER, BILATERAL: ICD-10-CM

## 2024-05-31 DIAGNOSIS — E04.1 NONTOXIC SINGLE THYROID NODULE: ICD-10-CM

## 2024-05-31 PROCEDURE — 31231 NASAL ENDOSCOPY DX: CPT

## 2024-05-31 PROCEDURE — 99214 OFFICE O/P EST MOD 30 MIN: CPT | Mod: 25

## 2024-05-31 NOTE — PHYSICAL EXAM
[Nasal Endoscopy Performed] : nasal endoscopy was performed, see procedure section for findings [Midline] : trachea located in midline position [Normal] : no rashes [de-identified] : Posttreatment changes, incision healing well, no LAD.   [de-identified] : Psoriactic changes along the conchal bowl AS.  [de-identified] : Slight weakness of the marginal nerve on the right.

## 2024-05-31 NOTE — PROCEDURE
[Dysphagia] : dysphagia not clearly evaluated by indirect laryngoscopy [None] : none [Flexible Endoscope] : examined with the flexible endoscope [Serial Number: ___] : Serial Number: [unfilled] [FreeTextEntry6] : After patient consents, a nasal endoscopy is performed.  No lesions in the NC or NPx. Posttreatment changes are noted. No evidence of purulence from the OMCs.  [de-identified] : NPC [de-identified] : After patient consents, a FFL is performed.  No lesions in the OPx, HPx or larynx.  Expected posttreatment changes, VC are mobile, airway patent.

## 2024-05-31 NOTE — HISTORY OF PRESENT ILLNESS
[de-identified] : 63 year old female with history of NPC and benign thyroid nodule. s/p revision right parotidectomy on 12/1/2021. CT chest and MRI on 7/3/2023 concern of new right parotid nodule strongly suspicious for recurrent tumor. No new cervical adenopathy. 8/15/2023 BX BIOPSY AND FNA POSITIVE FOR MALIGNANT CELLS. 9/2023 PET nodules in region of right parotid bed are compatible with metastatic disease.   CT Chest 05/10/24-Interval decreased size of previously described medial right middle lobe nodular opacity, ill-defined and likely representing subsegmental atelectasis. No new, enlarging or suspicious pulmonary nodule. MRI 5/10/24-  Right post parotidectomy changes without evidence of recurrent disease. Left level I prominent lymph node smaller from the most recent exam and stable from the earlier study. No other cervical adenopathy.  Patient is now post revision right parotidectomy on 11/1/2023 for susp of mets nodes, path c/w Metastatic SCCa, non-keratinizing, involving fibroadipose tissue and lymphoid tissue, Seven lymph nodes negative for metastatic carcinoma ,In-situ hybridization for MINO performed on block 1C is negative. Received 3 out of 5 chemo treatments. Completed 2/23/24. Continues to f/u with Dr. Nieves and Dr. Harley.  Reports oral and tongue pain. Notes oral dryness. Tolerating thin liquids soft foods. Reports 4-5 pound weight loss.

## 2024-07-01 ENCOUNTER — APPOINTMENT (OUTPATIENT)
Dept: RADIATION ONCOLOGY | Facility: CLINIC | Age: 64
End: 2024-07-01
Payer: MEDICAID

## 2024-07-01 VITALS
OXYGEN SATURATION: 100 % | RESPIRATION RATE: 18 BRPM | SYSTOLIC BLOOD PRESSURE: 138 MMHG | HEIGHT: 63 IN | WEIGHT: 106.92 LBS | TEMPERATURE: 97.7 F | BODY MASS INDEX: 18.95 KG/M2 | DIASTOLIC BLOOD PRESSURE: 85 MMHG | HEART RATE: 79 BPM

## 2024-07-01 PROCEDURE — 99213 OFFICE O/P EST LOW 20 MIN: CPT

## 2024-07-08 ENCOUNTER — APPOINTMENT (OUTPATIENT)
Dept: OTOLARYNGOLOGY | Facility: CLINIC | Age: 64
End: 2024-07-08

## 2024-07-31 ENCOUNTER — NON-APPOINTMENT (OUTPATIENT)
Age: 64
End: 2024-07-31

## 2024-08-04 NOTE — REVIEW OF SYSTEMS
[Dysphagia: Grade 1 - Symptomatic, able to eat regular diet] : Dysphagia: Grade 1 - Symptomatic, able to eat regular diet [Fatigue: Grade 1 - Fatigue relieved by rest] : Fatigue: Grade 1 - Fatigue relieved by rest [Mucositis Oral: Grade 0] : Mucositis Oral: Grade 0  [Xerostomia: Grade 1 - Symptomatic (e.g., dry or thick saliva) without significant dietary alteration; unstimulated saliva flow >0.2 ml/min] : Xerostomia: Grade 1 - Symptomatic (e.g., dry or thick saliva) without significant dietary alteration; unstimulated saliva flow >0.2 ml/min [Oral Pain: Grade 0] : Oral Pain: Grade 0 [Dysgeusia: Grade 0] : Dysgeusia: Grade 0 [Skin Hyperpigmentation: Grade 0] : Skin Hyperpigmentation: Grade 0 [Dermatitis Radiation: Grade 2 - Moderate to brisk erythema; patchy moist desquamation, mostly confined to skin folds and creases; moderate edema] : Dermatitis Radiation: Grade 2 - Moderate to brisk erythema; patchy moist desquamation, mostly confined to skin folds and creases; moderate edema

## 2024-08-04 NOTE — REVIEW OF SYSTEMS
[Dysphagia: Grade 1 - Symptomatic, able to eat regular diet] : Dysphagia: Grade 1 - Symptomatic, able to eat regular diet [Fatigue: Grade 1 - Fatigue relieved by rest] : Fatigue: Grade 1 - Fatigue relieved by rest [Xerostomia: Grade 1 - Symptomatic (e.g., dry or thick saliva) without significant dietary alteration; unstimulated saliva flow >0.2 ml/min] : Xerostomia: Grade 1 - Symptomatic (e.g., dry or thick saliva) without significant dietary alteration; unstimulated saliva flow >0.2 ml/min [Mucositis Oral: Grade 0] : Mucositis Oral: Grade 0  [Oral Pain: Grade 0] : Oral Pain: Grade 0 [Dysgeusia: Grade 0] : Dysgeusia: Grade 0 [Skin Hyperpigmentation: Grade 0] : Skin Hyperpigmentation: Grade 0 [Dermatitis Radiation: Grade 2 - Moderate to brisk erythema; patchy moist desquamation, mostly confined to skin folds and creases; moderate edema] : Dermatitis Radiation: Grade 2 - Moderate to brisk erythema; patchy moist desquamation, mostly confined to skin folds and creases; moderate edema

## 2024-08-05 ENCOUNTER — APPOINTMENT (OUTPATIENT)
Dept: RADIATION ONCOLOGY | Facility: CLINIC | Age: 64
End: 2024-08-05

## 2024-08-05 PROBLEM — R22.0 TONGUE SWELLING: Status: ACTIVE | Noted: 2024-08-01

## 2024-08-05 PROCEDURE — 99213 OFFICE O/P EST LOW 20 MIN: CPT | Mod: GC

## 2024-08-05 RX ORDER — DEXAMETHASONE 0.75 MG
1 TABLET ORAL
Qty: 0 | Refills: 0 | DISCHARGE
Start: 2024-08-05

## 2024-08-13 ENCOUNTER — OUTPATIENT (OUTPATIENT)
Dept: OUTPATIENT SERVICES | Facility: HOSPITAL | Age: 64
LOS: 1 days | Discharge: HOME | End: 2024-08-13

## 2024-08-13 DIAGNOSIS — Z98.89 OTHER SPECIFIED POSTPROCEDURAL STATES: Chronic | ICD-10-CM

## 2024-08-13 DIAGNOSIS — C11.9 MALIGNANT NEOPLASM OF NASOPHARYNX, UNSPECIFIED: ICD-10-CM

## 2024-08-13 DIAGNOSIS — Z90.49 ACQUIRED ABSENCE OF OTHER SPECIFIED PARTS OF DIGESTIVE TRACT: Chronic | ICD-10-CM

## 2024-08-14 NOTE — PHYSICAL EXAM
[General Appearance - Well Developed] : well developed [General Appearance - Well Nourished] : well nourished [Sclera] : the sclera and conjunctiva were normal [Extraocular Movements] : extraocular movements were intact [Outer Ear] : the ears and nose were normal in appearance [Hearing Threshold Finger Rub Not [place]] : hearing was normal [Examination Of The Oral Cavity] : the lips and gums were normal [] : no respiratory distress [Exaggerated Use Of Accessory Muscles For Inspiration] : no accessory muscle use [Skin Color & Pigmentation] : normal skin color and pigmentation [Oriented To Time, Place, And Person] : oriented to person, place, and time [de-identified] : swelling of the right side of tongue [de-identified] : slight deviation of the tongue to the left

## 2024-08-14 NOTE — PHYSICAL EXAM
[General Appearance - Well Developed] : well developed [General Appearance - Well Nourished] : well nourished [Sclera] : the sclera and conjunctiva were normal [Extraocular Movements] : extraocular movements were intact [Outer Ear] : the ears and nose were normal in appearance [Hearing Threshold Finger Rub Not [place]] : hearing was normal [Examination Of The Oral Cavity] : the lips and gums were normal [] : no respiratory distress [Exaggerated Use Of Accessory Muscles For Inspiration] : no accessory muscle use [Skin Color & Pigmentation] : normal skin color and pigmentation [Oriented To Time, Place, And Person] : oriented to person, place, and time [de-identified] : swelling of the right side of tongue [de-identified] : slight deviation of the tongue to the left

## 2024-08-14 NOTE — PHYSICAL EXAM
[General Appearance - Well Developed] : well developed [General Appearance - Well Nourished] : well nourished [Sclera] : the sclera and conjunctiva were normal [Extraocular Movements] : extraocular movements were intact [Outer Ear] : the ears and nose were normal in appearance [Hearing Threshold Finger Rub Not [place]] : hearing was normal [Examination Of The Oral Cavity] : the lips and gums were normal [] : no respiratory distress [Exaggerated Use Of Accessory Muscles For Inspiration] : no accessory muscle use [Skin Color & Pigmentation] : normal skin color and pigmentation [Oriented To Time, Place, And Person] : oriented to person, place, and time [de-identified] : swelling of the right side of tongue [de-identified] : slight deviation of the tongue to the left

## 2024-08-14 NOTE — HISTORY OF PRESENT ILLNESS
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  She is s/p SBRT total dose of 4000cGy in 5fx to right parotid 2/22/2024.  5/10/2024 MRI Orbit, Face and/or Neck: IMPRESSION:  Right post parotidectomy changes without evidence of recurrent disease. Left level I prominent lymph node smaller from the most recent exam and stable from the earlier study. No other cervical adenopathy.   5/10/2024 CT Chest: IMPRESSION: Interval decreased size of previously described medial right middle lobe nodular opacity, ill-defined and likely representing subsegmental atelectasis.  No new, enlarging or suspicious pulmonary nodule.   Presents today for follow up. She states that she has had more difficulty with eating foods and swallowing due to swelling of the tongue. She denies having any pain.

## 2024-08-15 ENCOUNTER — APPOINTMENT (OUTPATIENT)
Dept: OTOLARYNGOLOGY | Facility: CLINIC | Age: 64
End: 2024-08-15
Payer: MEDICAID

## 2024-08-15 ENCOUNTER — APPOINTMENT (OUTPATIENT)
Dept: RADIATION ONCOLOGY | Facility: CLINIC | Age: 64
End: 2024-08-15

## 2024-08-15 PROCEDURE — 92610 EVALUATE SWALLOWING FUNCTION: CPT | Mod: GN

## 2024-08-19 ENCOUNTER — INPATIENT (INPATIENT)
Facility: HOSPITAL | Age: 64
LOS: 2 days | Discharge: HOME | End: 2024-08-22
Attending: STUDENT IN AN ORGANIZED HEALTH CARE EDUCATION/TRAINING PROGRAM | Admitting: STUDENT IN AN ORGANIZED HEALTH CARE EDUCATION/TRAINING PROGRAM
Payer: MEDICAID

## 2024-08-19 VITALS
RESPIRATION RATE: 16 BRPM | DIASTOLIC BLOOD PRESSURE: 78 MMHG | HEIGHT: 63 IN | WEIGHT: 100.09 LBS | SYSTOLIC BLOOD PRESSURE: 116 MMHG | OXYGEN SATURATION: 98 % | TEMPERATURE: 98 F | HEART RATE: 128 BPM

## 2024-08-19 DIAGNOSIS — Z98.89 OTHER SPECIFIED POSTPROCEDURAL STATES: Chronic | ICD-10-CM

## 2024-08-19 DIAGNOSIS — Z90.49 ACQUIRED ABSENCE OF OTHER SPECIFIED PARTS OF DIGESTIVE TRACT: Chronic | ICD-10-CM

## 2024-08-19 LAB
ALBUMIN SERPL ELPH-MCNC: 3.7 G/DL — SIGNIFICANT CHANGE UP (ref 3.3–5)
ALP SERPL-CCNC: 107 U/L — SIGNIFICANT CHANGE UP (ref 40–120)
ALT FLD-CCNC: 22 U/L — SIGNIFICANT CHANGE UP (ref 4–33)
ANION GAP SERPL CALC-SCNC: 15 MMOL/L — HIGH (ref 7–14)
APPEARANCE UR: CLEAR — SIGNIFICANT CHANGE UP
APTT BLD: 27.7 SEC — SIGNIFICANT CHANGE UP (ref 24.5–35.6)
AST SERPL-CCNC: 27 U/L — SIGNIFICANT CHANGE UP (ref 4–32)
BASOPHILS # BLD AUTO: 0.04 K/UL — SIGNIFICANT CHANGE UP (ref 0–0.2)
BASOPHILS NFR BLD AUTO: 0.2 % — SIGNIFICANT CHANGE UP (ref 0–2)
BILIRUB SERPL-MCNC: 0.8 MG/DL — SIGNIFICANT CHANGE UP (ref 0.2–1.2)
BILIRUB UR-MCNC: NEGATIVE — SIGNIFICANT CHANGE UP
BLOOD GAS VENOUS COMPREHENSIVE RESULT: SIGNIFICANT CHANGE UP
BUN SERPL-MCNC: 12 MG/DL — SIGNIFICANT CHANGE UP (ref 7–23)
CALCIUM SERPL-MCNC: 8.7 MG/DL — SIGNIFICANT CHANGE UP (ref 8.4–10.5)
CHLORIDE SERPL-SCNC: 94 MMOL/L — LOW (ref 98–107)
CO2 SERPL-SCNC: 23 MMOL/L — SIGNIFICANT CHANGE UP (ref 22–31)
COLOR SPEC: YELLOW — SIGNIFICANT CHANGE UP
CREAT SERPL-MCNC: 0.6 MG/DL — SIGNIFICANT CHANGE UP (ref 0.5–1.3)
DIFF PNL FLD: NEGATIVE — SIGNIFICANT CHANGE UP
EGFR: 102 ML/MIN/1.73M2 — SIGNIFICANT CHANGE UP
EOSINOPHIL # BLD AUTO: 0.14 K/UL — SIGNIFICANT CHANGE UP (ref 0–0.5)
EOSINOPHIL NFR BLD AUTO: 0.7 % — SIGNIFICANT CHANGE UP (ref 0–6)
GLUCOSE SERPL-MCNC: 109 MG/DL — HIGH (ref 70–99)
GLUCOSE UR QL: NEGATIVE MG/DL — SIGNIFICANT CHANGE UP
HCT VFR BLD CALC: 38.1 % — SIGNIFICANT CHANGE UP (ref 34.5–45)
HGB BLD-MCNC: 13 G/DL — SIGNIFICANT CHANGE UP (ref 11.5–15.5)
IANC: 18.43 K/UL — HIGH (ref 1.8–7.4)
IMM GRANULOCYTES NFR BLD AUTO: 0.7 % — SIGNIFICANT CHANGE UP (ref 0–0.9)
INR BLD: 0.95 RATIO — SIGNIFICANT CHANGE UP (ref 0.85–1.18)
KETONES UR-MCNC: ABNORMAL MG/DL
LEUKOCYTE ESTERASE UR-ACNC: NEGATIVE — SIGNIFICANT CHANGE UP
LIDOCAIN IGE QN: 15 U/L — SIGNIFICANT CHANGE UP (ref 7–60)
LYMPHOCYTES # BLD AUTO: 0.61 K/UL — LOW (ref 1–3.3)
LYMPHOCYTES # BLD AUTO: 3 % — LOW (ref 13–44)
MCHC RBC-ENTMCNC: 28.1 PG — SIGNIFICANT CHANGE UP (ref 27–34)
MCHC RBC-ENTMCNC: 34.1 GM/DL — SIGNIFICANT CHANGE UP (ref 32–36)
MCV RBC AUTO: 82.5 FL — SIGNIFICANT CHANGE UP (ref 80–100)
MONOCYTES # BLD AUTO: 0.81 K/UL — SIGNIFICANT CHANGE UP (ref 0–0.9)
MONOCYTES NFR BLD AUTO: 4 % — SIGNIFICANT CHANGE UP (ref 2–14)
NEUTROPHILS # BLD AUTO: 18.43 K/UL — HIGH (ref 1.8–7.4)
NEUTROPHILS NFR BLD AUTO: 91.4 % — HIGH (ref 43–77)
NITRITE UR-MCNC: NEGATIVE — SIGNIFICANT CHANGE UP
NRBC # BLD: 0 /100 WBCS — SIGNIFICANT CHANGE UP (ref 0–0)
NRBC # FLD: 0 K/UL — SIGNIFICANT CHANGE UP (ref 0–0)
PH UR: 7.5 — SIGNIFICANT CHANGE UP (ref 5–8)
PLATELET # BLD AUTO: 243 K/UL — SIGNIFICANT CHANGE UP (ref 150–400)
POTASSIUM SERPL-MCNC: 4.6 MMOL/L — SIGNIFICANT CHANGE UP (ref 3.5–5.3)
POTASSIUM SERPL-SCNC: 4.6 MMOL/L — SIGNIFICANT CHANGE UP (ref 3.5–5.3)
PROT SERPL-MCNC: 6.4 G/DL — SIGNIFICANT CHANGE UP (ref 6–8.3)
PROT UR-MCNC: NEGATIVE MG/DL — SIGNIFICANT CHANGE UP
PROTHROM AB SERPL-ACNC: 10.7 SEC — SIGNIFICANT CHANGE UP (ref 9.5–13)
RBC # BLD: 4.62 M/UL — SIGNIFICANT CHANGE UP (ref 3.8–5.2)
RBC # FLD: 13.5 % — SIGNIFICANT CHANGE UP (ref 10.3–14.5)
SODIUM SERPL-SCNC: 132 MMOL/L — LOW (ref 135–145)
SP GR SPEC: 1.01 — SIGNIFICANT CHANGE UP (ref 1–1.03)
UROBILINOGEN FLD QL: 0.2 MG/DL — SIGNIFICANT CHANGE UP (ref 0.2–1)
WBC # BLD: 20.18 K/UL — HIGH (ref 3.8–10.5)
WBC # FLD AUTO: 20.18 K/UL — HIGH (ref 3.8–10.5)

## 2024-08-19 PROCEDURE — 99285 EMERGENCY DEPT VISIT HI MDM: CPT

## 2024-08-19 RX ORDER — ACETAMINOPHEN 325 MG/1
675 TABLET ORAL ONCE
Refills: 0 | Status: COMPLETED | OUTPATIENT
Start: 2024-08-19 | End: 2024-08-19

## 2024-08-19 RX ADMIN — Medication 4 MILLIGRAM(S): at 22:39

## 2024-08-19 NOTE — ED PROVIDER NOTE - PROGRESS NOTE DETAILS
Que OROZCO, EM/IM PGY-4: Pt disimpacted, will give SMOG enema as follow up as patient could not tolerate complete disimpaction, Alaniz placed for urinary retention. CT read with impaction and concern stercoral colitis, potential for ischemia. Pt lactate normal at 1.7. Hospitalist would like surgery consult prior to admission. Surgery paged

## 2024-08-19 NOTE — ED PROVIDER NOTE - ATTENDING CONTRIBUTION TO CARE
Agree with resident note  63-year-old female with past medical history of right parotidectomy with chemo and radiation presents the emergency room for diarrhea and rectal pain as well as urinary retention.  Patient states rectal pain is constant, not related to just defecation.  States mild discomfort when urinating.  Physical exam  Well-appearing female in no respiratory distress  Vital signs stable  Clear to auscultation bilaterally  S1-S2 normal rubs or gallops  Abdomen suprapubic tenderness, distention  Impression  Will get basic labs, CT abdomen pelvis, distended bladder, point-of-care ultrasound shows over 800 cc of urine, will place Alaniz catheter, rectal exam shows no signs of irritation or hemorrhoids  Possibly constipation causing urinary retention will reevaluate after CAT scan

## 2024-08-19 NOTE — ED ADULT TRIAGE NOTE - CHIEF COMPLAINT QUOTE
Pt c/o rectum pain and lower abdominal pain that started on friday.  Pt also c/o diarrhea.  Pt is actively taking chemo for right neck gland cancer.  Hx: HTN, Pt c/o rectum pain and lower abdominal pain and pain/difficulty urinating that started on friday.  Pt also c/o diarrhea.  Pt is actively taking chemo for right neck gland cancer.  Hx: HTN,

## 2024-08-19 NOTE — ED ADULT TRIAGE NOTE - PAIN: PRESENCE, MLM
----- Message from Juanito Browne MD sent at 12/16/2018  2:22 PM EST -----  Regarding: ov  Patient would like to switch to [facility], I do believe Dr. Burleson saw her first, office visit per her discretion    Admitted again over the weekend for a mild flare of ulcerative colitis, I did not use steroids but sent her home on topical mesalamine in addition to oral  
complains of pain/discomfort

## 2024-08-19 NOTE — ASSESSMENT
[FreeTextEntry1] : CLINICAL DYSPHAGIA EVALUATION  Patient Name: Yeon Ryu Date of Evaluation: 8/15/24 : 1960 Primary Diagnosis: Dysphagia R13.10 Treatment Diagnosis: Oropharyngeal dysphagia R13.12 Referring Physician: Dr. Hernandez  Procedure Code: 93452  REASON FOR REFERRAL: Yeon Ryu is a 63-year-old female who participated in a Clinical Dysphagia Evaluation upon referral from her physician, Dr. Hernandez, to assess speech and swallow function, status post revision right parotidectomy on 2023 for susp of mets nodes, path c/w Metastatic SCCa and ocmpletion of XRT (second round) 2024.   HISTORY OF PRESENTING ILLNESS: Patient arrived unaccompanied. She notes completing radiation therapy for the second time in 2024. In July, patient reported she began developing speech and swallowing changes marked by slurred speech, reduced speech intelligibility and food getting stuck. She notes swallowing symptoms have improved but speech remain. She denies choking with PO, recent or repeated PNA. She reports her weight is stable.  CURRENT NUTRITIONAL INTAKE: Solids: Regular solids Liquids: Thin Liquids  Medical History (as per EMHR) Active problems:   MEDICATIONS: Were reviewed and can be located in the patient's chart Allergies: No reported food or drug allergies  CLINICAL FINDINGS Oral Peripheral Assessment / Detailed Head and Neck Exam Structures: Within Functional Limits Symmetry: Right side less tone  Dentition: WFL Soft Palate: WFL Labial: Reduced pursing and strength on the right side  Lingual: WFL Mandibular: WFLs Neck: Cervical anterior and lateral lymphedema non-pitting more on the right  Buccal: Reduced movement and strength on the right side  Secretions: WFL Volitional Swallow: Adequate Volitional Cough: WFL Voice: WFLs Cognition/Communication: WFL Motor Speech: Mild dysarthria marked by articulatory imprecision   Consistencies Administered: 1. Solids 2. Thin fluids via cup  Oral Stages: SOLID: Adequate jaw grading and labial seal with good oral containment of bolus. Adequate lingual movements and sufficient mastication pattern demonstrated with cohesive bolus formation. Adequate AP bolus transfer and timely oral transit noted.  THIN LIQUID: Adequate jaw stability and lip closure, adequate bolus expression from straw, good oral containment of bolus.  Pharyngeal Stage: Initiation: Prompt Hyolaryngeal elevation: Present Swallows: Puree/solid: 1 Overt signs/symptoms of penetration/aspiration: No   IMPRESSIONS: Pt presents with new onset of lingual weakness resulting in speech changes, s/p completion of XRT for the second time  PROGNOSIS: Good  RECOMMENDATIONS: 1. Continue oral diet of regular solids and thin liquids 2. Initiate speech therapy / MLD to maximize speech function/ROM 1x/week for 6-8 weeks  3. Follow up with referring physician as directed  EDUCATION: The patient was educated at length regarding the results, recommendations, safety precautions, compensatory strategies and plan as stated above.  Should you have any additional concerns, please contact this center at [phone].  Carolina Patten MA, CCC-SLP Speech-Language Pathologist

## 2024-08-19 NOTE — ASSESSMENT
[FreeTextEntry1] : CLINICAL DYSPHAGIA EVALUATION  Patient Name: Yeon Ryu Date of Evaluation: 8/15/24 : 1960 Primary Diagnosis: Dysphagia R13.10 Treatment Diagnosis: Oropharyngeal dysphagia R13.12 Referring Physician: Dr. Hernandez  Procedure Code: 02065  REASON FOR REFERRAL: Yeon Ryu is a 63-year-old female who participated in a Clinical Dysphagia Evaluation upon referral from her physician, Dr. Hernandez, to assess speech and swallow function, status post revision right parotidectomy on 2023 for susp of mets nodes, path c/w Metastatic SCCa and ocmpletion of XRT (second round) 2024.   HISTORY OF PRESENTING ILLNESS: Patient arrived unaccompanied. She notes completing radiation therapy for the second time in 2024. In July, patient reported she began developing speech and swallowing changes marked by slurred speech, reduced speech intelligibility and food getting stuck. She notes swallowing symptoms have improved but speech remain. She denies choking with PO, recent or repeated PNA. She reports her weight is stable.  CURRENT NUTRITIONAL INTAKE: Solids: Regular solids Liquids: Thin Liquids  Medical History (as per EMHR) Active problems:   MEDICATIONS: Were reviewed and can be located in the patient's chart Allergies: No reported food or drug allergies  CLINICAL FINDINGS Oral Peripheral Assessment / Detailed Head and Neck Exam Structures: Within Functional Limits Symmetry: Right side less tone  Dentition: WFL Soft Palate: WFL Labial: Reduced pursing and strength on the right side  Lingual: WFL Mandibular: WFLs Neck: Cervical anterior and lateral lymphedema non-pitting more on the right  Buccal: Reduced movement and strength on the right side  Secretions: WFL Volitional Swallow: Adequate Volitional Cough: WFL Voice: WFLs Cognition/Communication: WFL Motor Speech: Mild dysarthria marked by articulatory imprecision   Consistencies Administered: 1. Solids 2. Thin fluids via cup  Oral Stages: SOLID: Adequate jaw grading and labial seal with good oral containment of bolus. Adequate lingual movements and sufficient mastication pattern demonstrated with cohesive bolus formation. Adequate AP bolus transfer and timely oral transit noted.  THIN LIQUID: Adequate jaw stability and lip closure, adequate bolus expression from straw, good oral containment of bolus.  Pharyngeal Stage: Initiation: Prompt Hyolaryngeal elevation: Present Swallows: Puree/solid: 1 Overt signs/symptoms of penetration/aspiration: No   IMPRESSIONS: Pt presents with new onset of lingual weakness resulting in speech changes, s/p completion of XRT for the second time  PROGNOSIS: Good  RECOMMENDATIONS: 1. Continue oral diet of regular solids and thin liquids 2. Initiate speech therapy / MLD to maximize speech function/ROM 1x/week for 6-8 weeks  3. Follow up with referring physician as directed  EDUCATION: The patient was educated at length regarding the results, recommendations, safety precautions, compensatory strategies and plan as stated above.  Should you have any additional concerns, please contact this center at [phone].  Carolina Patten MA, CCC-SLP Speech-Language Pathologist

## 2024-08-19 NOTE — ED PROVIDER NOTE - OBJECTIVE STATEMENT
63 y.o. F w/ PMHx of right parotidectomy w/ chemo/radiation therapy (last session 03/04/2024) presenting to ED w/ 3 days of recurrent diarrhea, rectal pain, and urinary retention. Pt's diarrhea has been near constant since onset and has not been bloody. Pt's rectal pain is present on both passing diarrhea and at rest, and will intermittently flare up causing her severe pain. Notes she has urinated far fewer times since onset of sxs and that she did experience discomfort on urination when able to go. Notes she has intermittent lower belly pain, though her rectal pain feels much more significant currently. Denies recent Abx use, sick contacts, travel, C/P, SOB, dizziness, vomiting, syncope, hematuria, fecal incontinence. States she feels mildly warm but last temp at home was 97.5.     Notes that she had intermittent constipation on week prior to sxs onset, though otherwise was in good health. Notes semi-regular history of constipation as per pt.

## 2024-08-19 NOTE — ED ADULT NURSE NOTE - CHIEF COMPLAINT QUOTE
Pt c/o rectum pain and lower abdominal pain and pain/difficulty urinating that started on friday.  Pt also c/o diarrhea.  Pt is actively taking chemo for right neck gland cancer.  Hx: HTN,

## 2024-08-19 NOTE — ED ADULT NURSE NOTE - NSFALLUNIVINTERV_ED_ALL_ED
Bed/Stretcher in lowest position, wheels locked, appropriate side rails in place/Call bell, personal items and telephone in reach/Instruct patient to call for assistance before getting out of bed/chair/stretcher/Non-slip footwear applied when patient is off stretcher/[place] to call system/Physically safe environment - no spills, clutter or unnecessary equipment/Purposeful proactive rounding/Room/bathroom lighting operational, light cord in reach

## 2024-08-19 NOTE — ED ADULT NURSE NOTE - OBJECTIVE STATEMENT
Pt arrives to ED rm 2 A&Ox4 and ambulatory c/o abd pain and rectal pain since Friday. Pt has hx of HTN and R neck gland CA, last chemo was in February. Pt states that starting on Wednesday of last week they became constipated which lasted 2 days, then on Friday they pt began to experience diarrhea. Pt states since onset of diarrhea they have been experiencing more frequent episodes of diarrhea, diffuse lower abd pain and rectal discomfort. Pt denies black tarry stool or bright red blood present in diarrhea. Abd is nondistended, soft upon palpation but is extremely tender. Pt endorses nausea, but no vomiting. Pt denies fevers, chills, SOB, chest pain, headahces. MD at bedside for eval, pending nursing interventions Pt arrives to ED rm 2 A&Ox4 and ambulatory c/o abd pain and rectal pain since Friday. Pt has hx of HTN and R neck gland CA, last chemo was in February. Pt states that starting on Wednesday of last week they became constipated which lasted 2 days, then on Friday they pt began to experience diarrhea. Pt states since onset of diarrhea they have been experiencing more frequent episodes of diarrhea, diffuse lower abd pain and rectal discomfort. Pt denies black tarry stool or bright red blood present in diarrhea. Pt also endorses experiencing difficulty with urination. Abd is nondistended, soft upon palpation but is extremely tender. Pt endorses nausea, but no vomiting. Pt denies fevers, chills, SOB, chest pain, headaches. MD at bedside for eval, pending nursing interventions

## 2024-08-19 NOTE — ED PROVIDER NOTE - PHYSICAL EXAMINATION
General - Not in active distress, though will have episodes of pain during conversation in which pt curls up and remains silent  Cardiac - RRR  Lungs - CTA b/l  Abd - TTP over abdomen diffusely, with increased tenderness on lower compared to upper. No CVA tenderness. Lower abdomen noted to be mildly distended as her pt and .   LE - no swelling b/l  Neuro - grossly intact, ambulating all four extremities spontaneously without difficulties or coordination challenges. No gait imbalances noted.

## 2024-08-19 NOTE — ED PROVIDER NOTE - CLINICAL SUMMARY MEDICAL DECISION MAKING FREE TEXT BOX
63 y.o. F presenting to ED w/ 3 days of rectal pain, non-bloody diarrhea, urinary retention, currently concerning for colitis vs proctocolitis. Plan to get labs (CBC, BMP, Lipase), CT Abd w/ contrast.     Disposition: likely dependent on results of initially ordered imaging. Appears that patient's pain may require inpatient level of care, though she may be able to be discharged w/ outpatient analgesia if able to tolerate oral intake otherwise

## 2024-08-20 DIAGNOSIS — R33.9 RETENTION OF URINE, UNSPECIFIED: ICD-10-CM

## 2024-08-20 DIAGNOSIS — A41.9 SEPSIS, UNSPECIFIED ORGANISM: ICD-10-CM

## 2024-08-20 DIAGNOSIS — K59.00 CONSTIPATION, UNSPECIFIED: ICD-10-CM

## 2024-08-20 DIAGNOSIS — Z29.9 ENCOUNTER FOR PROPHYLACTIC MEASURES, UNSPECIFIED: ICD-10-CM

## 2024-08-20 DIAGNOSIS — K52.89 OTHER SPECIFIED NONINFECTIVE GASTROENTERITIS AND COLITIS: ICD-10-CM

## 2024-08-20 DIAGNOSIS — I10 ESSENTIAL (PRIMARY) HYPERTENSION: ICD-10-CM

## 2024-08-20 LAB
ANION GAP SERPL CALC-SCNC: 13 MMOL/L — SIGNIFICANT CHANGE UP (ref 7–14)
BUN SERPL-MCNC: 14 MG/DL — SIGNIFICANT CHANGE UP (ref 7–23)
CALCIUM SERPL-MCNC: 7.6 MG/DL — LOW (ref 8.4–10.5)
CHLORIDE SERPL-SCNC: 100 MMOL/L — SIGNIFICANT CHANGE UP (ref 98–107)
CO2 SERPL-SCNC: 23 MMOL/L — SIGNIFICANT CHANGE UP (ref 22–31)
CREAT SERPL-MCNC: 0.56 MG/DL — SIGNIFICANT CHANGE UP (ref 0.5–1.3)
EGFR: 104 ML/MIN/1.73M2 — SIGNIFICANT CHANGE UP
GLUCOSE SERPL-MCNC: 132 MG/DL — HIGH (ref 70–99)
HCT VFR BLD CALC: 33.2 % — LOW (ref 34.5–45)
HGB BLD-MCNC: 11.3 G/DL — LOW (ref 11.5–15.5)
MAGNESIUM SERPL-MCNC: 2.2 MG/DL — SIGNIFICANT CHANGE UP (ref 1.6–2.6)
MCHC RBC-ENTMCNC: 28.5 PG — SIGNIFICANT CHANGE UP (ref 27–34)
MCHC RBC-ENTMCNC: 34 GM/DL — SIGNIFICANT CHANGE UP (ref 32–36)
MCV RBC AUTO: 83.8 FL — SIGNIFICANT CHANGE UP (ref 80–100)
NRBC # BLD: 0 /100 WBCS — SIGNIFICANT CHANGE UP (ref 0–0)
NRBC # FLD: 0 K/UL — SIGNIFICANT CHANGE UP (ref 0–0)
PHOSPHATE SERPL-MCNC: 3.5 MG/DL — SIGNIFICANT CHANGE UP (ref 2.5–4.5)
PLATELET # BLD AUTO: 221 K/UL — SIGNIFICANT CHANGE UP (ref 150–400)
POTASSIUM SERPL-MCNC: 3.7 MMOL/L — SIGNIFICANT CHANGE UP (ref 3.5–5.3)
POTASSIUM SERPL-SCNC: 3.7 MMOL/L — SIGNIFICANT CHANGE UP (ref 3.5–5.3)
RBC # BLD: 3.96 M/UL — SIGNIFICANT CHANGE UP (ref 3.8–5.2)
RBC # FLD: 13.2 % — SIGNIFICANT CHANGE UP (ref 10.3–14.5)
SODIUM SERPL-SCNC: 136 MMOL/L — SIGNIFICANT CHANGE UP (ref 135–145)
WBC # BLD: 10.72 K/UL — HIGH (ref 3.8–10.5)
WBC # FLD AUTO: 10.72 K/UL — HIGH (ref 3.8–10.5)

## 2024-08-20 PROCEDURE — 99223 1ST HOSP IP/OBS HIGH 75: CPT

## 2024-08-20 PROCEDURE — 74177 CT ABD & PELVIS W/CONTRAST: CPT | Mod: 26,MC

## 2024-08-20 RX ORDER — SODIUM CHLORIDE 9 MG/ML
1000 INJECTION INTRAMUSCULAR; INTRAVENOUS; SUBCUTANEOUS ONCE
Refills: 0 | Status: COMPLETED | OUTPATIENT
Start: 2024-08-20 | End: 2024-08-20

## 2024-08-20 RX ORDER — POLYETHYLENE GLYCOL 3350 17 G/17G
17 POWDER, FOR SOLUTION ORAL
Refills: 0 | Status: DISCONTINUED | OUTPATIENT
Start: 2024-08-20 | End: 2024-08-22

## 2024-08-20 RX ORDER — LIDOCAINE/BENZALKONIUM/ALCOHOL
1 SOLUTION, NON-ORAL TOPICAL ONCE
Refills: 0 | Status: COMPLETED | OUTPATIENT
Start: 2024-08-20 | End: 2024-08-20

## 2024-08-20 RX ORDER — SENNA 187 MG
2 TABLET ORAL AT BEDTIME
Refills: 0 | Status: DISCONTINUED | OUTPATIENT
Start: 2024-08-20 | End: 2024-08-22

## 2024-08-20 RX ORDER — ACETAMINOPHEN 325 MG/1
650 TABLET ORAL EVERY 6 HOURS
Refills: 0 | Status: DISCONTINUED | OUTPATIENT
Start: 2024-08-20 | End: 2024-08-22

## 2024-08-20 RX ORDER — METRONIDAZOLE 250 MG
500 TABLET ORAL ONCE
Refills: 0 | Status: COMPLETED | OUTPATIENT
Start: 2024-08-20 | End: 2024-08-20

## 2024-08-20 RX ORDER — ENOXAPARIN SODIUM 100 MG/ML
30 INJECTION SUBCUTANEOUS EVERY 24 HOURS
Refills: 0 | Status: DISCONTINUED | OUTPATIENT
Start: 2024-08-20 | End: 2024-08-22

## 2024-08-20 RX ORDER — SODIUM CHLORIDE 9 MG/ML
1000 INJECTION INTRAMUSCULAR; INTRAVENOUS; SUBCUTANEOUS
Refills: 0 | Status: DISCONTINUED | OUTPATIENT
Start: 2024-08-20 | End: 2024-08-20

## 2024-08-20 RX ORDER — POLYETHYLENE GLYCOL 3350 17 G/17G
17 POWDER, FOR SOLUTION ORAL DAILY
Refills: 0 | Status: DISCONTINUED | OUTPATIENT
Start: 2024-08-20 | End: 2024-08-20

## 2024-08-20 RX ORDER — METRONIDAZOLE 250 MG
500 TABLET ORAL EVERY 8 HOURS
Refills: 0 | Status: DISCONTINUED | OUTPATIENT
Start: 2024-08-20 | End: 2024-08-22

## 2024-08-20 RX ORDER — METRONIDAZOLE 250 MG
TABLET ORAL
Refills: 0 | Status: DISCONTINUED | OUTPATIENT
Start: 2024-08-20 | End: 2024-08-22

## 2024-08-20 RX ORDER — HYDROMORPHONE HYDROCHLORIDE 2 MG/1
0.5 TABLET ORAL ONCE
Refills: 0 | Status: DISCONTINUED | OUTPATIENT
Start: 2024-08-20 | End: 2024-08-20

## 2024-08-20 RX ORDER — LIDOCAINE/BENZALKONIUM/ALCOHOL
1 SOLUTION, NON-ORAL TOPICAL ONCE
Refills: 0 | Status: DISCONTINUED | OUTPATIENT
Start: 2024-08-20 | End: 2024-08-20

## 2024-08-20 RX ADMIN — SODIUM CHLORIDE 1000 MILLILITER(S): 9 INJECTION INTRAMUSCULAR; INTRAVENOUS; SUBCUTANEOUS at 16:21

## 2024-08-20 RX ADMIN — ENOXAPARIN SODIUM 30 MILLIGRAM(S): 100 INJECTION SUBCUTANEOUS at 18:21

## 2024-08-20 RX ADMIN — POLYETHYLENE GLYCOL 3350 17 GRAM(S): 17 POWDER, FOR SOLUTION ORAL at 21:10

## 2024-08-20 RX ADMIN — ACETAMINOPHEN 270 MILLIGRAM(S): 325 TABLET ORAL at 00:02

## 2024-08-20 RX ADMIN — Medication 100 MILLIGRAM(S): at 22:05

## 2024-08-20 RX ADMIN — Medication 5 MILLIGRAM(S): at 23:22

## 2024-08-20 RX ADMIN — Medication 1 APPLICATION(S): at 04:42

## 2024-08-20 RX ADMIN — Medication 2 TABLET(S): at 21:10

## 2024-08-20 RX ADMIN — Medication 4 MILLIGRAM(S): at 02:05

## 2024-08-20 RX ADMIN — Medication 4 MILLIGRAM(S): at 02:52

## 2024-08-20 RX ADMIN — HYDROMORPHONE HYDROCHLORIDE 0.5 MILLIGRAM(S): 2 TABLET ORAL at 04:34

## 2024-08-20 RX ADMIN — Medication 4 MILLIGRAM(S): at 00:00

## 2024-08-20 RX ADMIN — Medication 100 MILLIGRAM(S): at 18:21

## 2024-08-20 RX ADMIN — HYDROMORPHONE HYDROCHLORIDE 0.5 MILLIGRAM(S): 2 TABLET ORAL at 02:56

## 2024-08-20 RX ADMIN — Medication 100 MILLIGRAM(S): at 17:25

## 2024-08-20 NOTE — H&P ADULT - NSHPLABSRESULTS_GEN_ALL_CORE
13.0   20.18 )-----------( 243      ( 19 Aug 2024 22:39 )             38.1           132<L>  |  94<L>  |  12  ----------------------------<  109<H>  4.6   |  23  |  0.60    Ca    8.7      19 Aug 2024 22:39    TPro  6.4  /  Alb  3.7  /  TBili  0.8  /  DBili  x   /  AST  27  /  ALT  22  /  AlkPhos  107                Urinalysis Basic - ( 19 Aug 2024 22:39 )    Color: Yellow / Appearance: Clear / S.006 / pH: x  Gluc: 109 mg/dL / Ketone: Trace mg/dL  / Bili: Negative / Urobili: 0.2 mg/dL   Blood: x / Protein: Negative mg/dL / Nitrite: Negative   Leuk Esterase: Negative / RBC: x / WBC x   Sq Epi: x / Non Sq Epi: x / Bacteria: x        PT/INR - ( 19 Aug 2024 22:39 )   PT: 10.7 sec;   INR: 0.95 ratio         PTT - ( 19 Aug 2024 22:39 )  PTT:27.7 sec    Lactate Trend            CAPILLARY BLOOD GLUCOSE        CTAP: Rectal stool impaction. Changes including rectal wall thickening and   stranding of the mesorectal could represent early changes of ischemia   (stercoral colitis). Emergent surgical evaluation is advised.

## 2024-08-20 NOTE — H&P ADULT - PROBLEM SELECTOR PLAN 1
2/2 stercoral colitis  Leukocytosis and tachycardia. Clinically much improved, however will obtain bcx and start empiric abx out of precaution given severity of imaging findings and degree of leukocytosis. No lactate on admission. BP soft. Surgery consulted severe stercoral colitis, no surgical intervention necessary.  - IVF  - empiric CTX and flagyl  - f/u bcx  - bowel regimen as below

## 2024-08-20 NOTE — PATIENT PROFILE ADULT - FALL HARM RISK - HARM RISK INTERVENTIONS

## 2024-08-20 NOTE — ED ADULT NURSE REASSESSMENT NOTE - NS ED NURSE REASSESS COMMENT FT1
After MD Escobar conducted bedside US of abd, pt was noted to have full bladder, 16Fr urinary catheter placed as per MD order. 18G placed to R wrist, labs drawn and sent. Medicated as ordered
Break RN: Patient awake and resting in stretcher; respirations even and unlabored, no signs/symptoms of acute distress. Patient denies dyspnea, shortness of breath, and chest pain. Patient denies pain and offers no complaints at this time. JUSTO Lal called and made aware of patient's desire to leave and be discharged; JUSTO Lal advised she will come to patient bedside and speak to patient. Safety measures in place, will provide care as needed.
Received rpt from ANGIESZKA Pendleton. Pt. A&Ox4, lying in bed comfortably. States she had bowel movement earlier this AM. Still has slight discomfort in abdomen. Alaniz catheter in place. Vitals stable and documented. Respirations even and unlabored. Will continue to monitor.
Pt was not able to tolerate CT due to pain, MD Grey notified of increase in pain. Pt given IV medication as ordered
Pt resting comfortably in stretcher, vitals as charted, respirations are even and unlabored. Pt endorses partial relief of pain. Pt awaiting SMOG enema from pharmacy

## 2024-08-20 NOTE — H&P ADULT - ASSESSMENT
63F w/ HTN, psoriasis and hx SCC of parotid s/p R parotidectomy and chemoRT (last 2/2024) who presents for abdominal pain, admitted for sepsis 2/2 stercoral colitis.

## 2024-08-20 NOTE — H&P ADULT - NSHPSOCIALHISTORY_GEN_ALL_CORE
former smoker, quit at age 52  social etoh use  Recently has required assistance to ambulate due to generalized weakness

## 2024-08-20 NOTE — H&P ADULT - HISTORY OF PRESENT ILLNESS
63F w/ HTN, psoriasis and hx SCC of parotid s/p R parotidectomy and chemoRT (last 2/2024) who presents for abdominal pain. Pt c/o diarrhea, rectal pain, urinary retention and diffuse abdominal pain since 8/16. Denies fevers, chills, cp, sob, n/v, or paresthesias. Able to tolerate PO however poor appetite. In the ED, initially tachycardic. Labs significant for WBC 20. CTAP with significant rectal stool impaction and stercoral colitis. S/p enema and manual disimpaction significant relief. Pt endorses two additional large BM afterwards. She states that pain has completely resolved post BM. Surgery was consulted re ischemic changes, no surgical intervention. Admitted to medicine for further management.

## 2024-08-20 NOTE — H&P ADULT - PROBLEM SELECTOR PLAN 4
DVT: lovenox  Diet: regular  Dispo: pending clinical course, PT recs as patient is deconditioned home: amlodipine 10 mg daily  BP soft. Hold BP meds.

## 2024-08-20 NOTE — H&P ADULT - PROBLEM SELECTOR PLAN 3
home: amlodipine 10 mg daily  BP soft. Hold BP meds. no Likely 2/2 severe constipation. UA non infectious. Alaniz placed in ED  - TOV tonight

## 2024-08-20 NOTE — CONSULT NOTE ADULT - SUBJECTIVE AND OBJECTIVE BOX
63 y.o. F w/ PMHx of right parotidectomy w/ chemo/radiation therapy (last session 03/04/2024) presenting to ED w/ 3 days of recurrent diarrhea, rectal pain, and urinary retention. Pt's diarrhea has been near constant since onset and has not been bloody. Pt's rectal pain is present on both passing diarrhea and at rest, and will intermittently flare up causing her severe pain. Notes she has urinated far fewer times since onset of sxs and that she did experience discomfort on urination when able to go. Notes she has intermittent lower belly pain, though her rectal pain feels much more significant currently. Denies recent Abx use, sick contacts, travel, C/P, SOB, dizziness, vomiting, syncope, hematuria, fecal incontinence. States she feels mildly warm but last temp at home was 97.5.     Patient reports longstanding history of constipation. She has never had abdominal surgery before. She has been tolerating PO without nausea or emesis. Patient reports that she had manual disimpaction and an enema in the ED with large stool output and feels much better. Initially patient afebrile and tachycardic with normal blood pressure. Labs notable for WBC 20. Normal lactate. CTAP with concern for stercoral colitis.    PAST MEDICAL HISTORY: Enlargement of Lymph Node    Malignant neoplasm of nasopharynx, unspecified    History of chemotherapy    History of radiation therapy    Psoriasis    Stroke    HTN (hypertension)        PAST SURGICAL HISTORY: No Surgical Hx    S/P Tonsillectomy    S/P ear surgery    H/O superficial parotidectomy    H/O parotidectomy    History of parotidectomy        HOME MEDICATIONS:    ALLERGIES: No Known Allergies      FAMILY HISTORY: Hypertension    Type 2 diabetes mellitus (Sibling)        SOCIAL HISTORY:    REVIEW OF SYSTEMS:    VITAL SIGNS:  ICU Vital Signs Last 24 Hrs  T(C): 37.2 (20 Aug 2024 06:39), Max: 37.2 (20 Aug 2024 06:39)  T(F): 98.9 (20 Aug 2024 06:39), Max: 98.9 (20 Aug 2024 06:39)  HR: 76 (20 Aug 2024 06:39) (76 - 128)  BP: 104/81 (20 Aug 2024 06:39) (101/79 - 116/78)  BP(mean): --  ABP: --  ABP(mean): --  RR: 16 (20 Aug 2024 06:39) (16 - 18)  SpO2: 100% (20 Aug 2024 06:39) (98% - 100%)    O2 Parameters below as of 20 Aug 2024 02:24  Patient On (Oxygen Delivery Method): room air            PHYSICAL EXAMINATION:  General - well-nourished, no acute distress  Neuro - awake, alert, oriented   Lungs - breathing comfortably on room air  Heart - pulse regular  Abdomen - soft, nontender, nondistended  Extremities - all four extremities are warm    LABS:                          13.0   20.18 )-----------( 243      ( 19 Aug 2024 22:39 )             38.1       08-19    132<L>  |  94<L>  |  12  ----------------------------<  109<H>  4.6   |  23  |  0.60    Ca    8.7      19 Aug 2024 22:39    TPro  6.4  /  Alb  3.7  /  TBili  0.8  /  DBili  x   /  AST  27  /  ALT  22  /  AlkPhos  107  08-19      PT/INR - ( 19 Aug 2024 22:39 )   PT: 10.7 sec;   INR: 0.95 ratio         PTT - ( 19 Aug 2024 22:39 )  PTT:27.7 sec        VBG - ( 19 Aug 2024 22:39 )  pH: 7.42  /  pCO2: 45    /  pO2: 37    / HCO3: 29    / Base Excess: 4.0   /  SaO2: 61.9   Lactate: 1.7      IMAGING STUDIES:  < from: CT Abdomen and Pelvis w/ IV Cont (08.20.24 @ 02:12) >  CONTRAST/COMPLICATIONS:  IV Contrast: Omnipaque 350  80cc administered   20 cc discarded  Oral Contrast: NONE  Complications: None reported at time of study completion    PROCEDURE:  CT of the Abdomen and Pelvis was performed.  Sagittal and coronal reformats were performed.    FINDINGS:  LOWER CHEST: Mild to a both lung bases, right greater than left.    LIVER: Indeterminate hypodense liver lesions of varying sizes. No   internal enhancement; likely cysts; correlate with outpatient ultrasound   for confirmation. Diffuse hepatic steatosis noted.  BILEDUCTS: Normal caliber.  GALLBLADDER: Within normal limits.  SPLEEN: Within normal limits.  PANCREAS: Within normal limits.  ADRENALS: Within normal limits.  KIDNEYS/URETERS: Indeterminate subcentimeter bilateral renal   hypodensities; outpatient ultrasound for further assessment, if   clinically indicated.    BLADDER: Alaniz catheter.  REPRODUCTIVE ORGANS: Fibroid uterus.    BOWEL: No bowel obstruction. Appendix is normal. Fecal impaction within   the rectum which is dilated measuring up to 7.4 x 7.4 cm. Moderate   diffuse rectal wall thickening with moderate mesorectal fat inflammatory   stranding. Abundant fluid throughout multiple segments of proximal colon.  PERITONEUM/RETROPERITONEUM: Within normal limits.  VESSELS: Atherosclerotic changes.  LYMPH NODES: No lymphadenopathy.  ABDOMINAL WALL: Within normal limits.  BONES: Degenerative changes. Diffuse osseous demineralization.    IMPRESSION:    Rectal stool impaction. Changes including rectal wall thickening and   stranding of the mesorectal could represent early changes of ischemia   (stercoral colitis). Emergent surgical evaluation is advised.    < end of copied text >

## 2024-08-20 NOTE — CONSULT NOTE ADULT - ASSESSMENT
63 y.o. F w/ PMHx of right parotidectomy w/ chemo/radiation therapy (last session 03/04/2024) presenting with concern for stercoral colitis.    Recommendations:  - no acute surgical intervention indicated  - continue bowel regimen  - Zosyn  - trend WBC  - care per primary team     discussed with Dr. Smith Team Surgery  [id number]

## 2024-08-20 NOTE — PATIENT PROFILE ADULT - FUNCTIONAL ASSESSMENT - BASIC MOBILITY 6.
3-calculated by average/Not able to assess (calculate score using [facility] averaging method)

## 2024-08-21 LAB
ANION GAP SERPL CALC-SCNC: 11 MMOL/L — SIGNIFICANT CHANGE UP (ref 7–14)
BUN SERPL-MCNC: 7 MG/DL — SIGNIFICANT CHANGE UP (ref 7–23)
CALCIUM SERPL-MCNC: 8.4 MG/DL — SIGNIFICANT CHANGE UP (ref 8.4–10.5)
CHLORIDE SERPL-SCNC: 100 MMOL/L — SIGNIFICANT CHANGE UP (ref 98–107)
CO2 SERPL-SCNC: 25 MMOL/L — SIGNIFICANT CHANGE UP (ref 22–31)
CREAT SERPL-MCNC: 0.57 MG/DL — SIGNIFICANT CHANGE UP (ref 0.5–1.3)
EGFR: 103 ML/MIN/1.73M2 — SIGNIFICANT CHANGE UP
GLUCOSE SERPL-MCNC: 82 MG/DL — SIGNIFICANT CHANGE UP (ref 70–99)
HCT VFR BLD CALC: 34.7 % — SIGNIFICANT CHANGE UP (ref 34.5–45)
HGB BLD-MCNC: 11.8 G/DL — SIGNIFICANT CHANGE UP (ref 11.5–15.5)
MAGNESIUM SERPL-MCNC: 1.9 MG/DL — SIGNIFICANT CHANGE UP (ref 1.6–2.6)
MCHC RBC-ENTMCNC: 28.6 PG — SIGNIFICANT CHANGE UP (ref 27–34)
MCHC RBC-ENTMCNC: 34 GM/DL — SIGNIFICANT CHANGE UP (ref 32–36)
MCV RBC AUTO: 84 FL — SIGNIFICANT CHANGE UP (ref 80–100)
NRBC # BLD: 0 /100 WBCS — SIGNIFICANT CHANGE UP (ref 0–0)
NRBC # FLD: 0 K/UL — SIGNIFICANT CHANGE UP (ref 0–0)
PHOSPHATE SERPL-MCNC: 2.8 MG/DL — SIGNIFICANT CHANGE UP (ref 2.5–4.5)
PLATELET # BLD AUTO: 232 K/UL — SIGNIFICANT CHANGE UP (ref 150–400)
POTASSIUM SERPL-MCNC: 3.9 MMOL/L — SIGNIFICANT CHANGE UP (ref 3.5–5.3)
POTASSIUM SERPL-SCNC: 3.9 MMOL/L — SIGNIFICANT CHANGE UP (ref 3.5–5.3)
RBC # BLD: 4.13 M/UL — SIGNIFICANT CHANGE UP (ref 3.8–5.2)
RBC # FLD: 13.2 % — SIGNIFICANT CHANGE UP (ref 10.3–14.5)
SODIUM SERPL-SCNC: 136 MMOL/L — SIGNIFICANT CHANGE UP (ref 135–145)
WBC # BLD: 6.79 K/UL — SIGNIFICANT CHANGE UP (ref 3.8–10.5)
WBC # FLD AUTO: 6.79 K/UL — SIGNIFICANT CHANGE UP (ref 3.8–10.5)

## 2024-08-21 PROCEDURE — 99232 SBSQ HOSP IP/OBS MODERATE 35: CPT

## 2024-08-21 RX ORDER — DIAZEPAM 10 MG
5 TABLET ORAL ONCE
Refills: 0 | Status: DISCONTINUED | OUTPATIENT
Start: 2024-08-21 | End: 2024-08-21

## 2024-08-21 RX ORDER — WITCH HAZEL 1 G/ML
1 LIQUID TOPICAL
Refills: 0 | Status: DISCONTINUED | OUTPATIENT
Start: 2024-08-21 | End: 2024-08-22

## 2024-08-21 RX ADMIN — Medication 4 MILLIGRAM(S): at 00:28

## 2024-08-21 RX ADMIN — Medication 100 MILLIGRAM(S): at 05:07

## 2024-08-21 RX ADMIN — Medication 2 MILLIGRAM(S): at 05:51

## 2024-08-21 RX ADMIN — WITCH HAZEL 1 APPLICATION(S): 1 LIQUID TOPICAL at 22:11

## 2024-08-21 RX ADMIN — Medication 100 MILLIGRAM(S): at 22:12

## 2024-08-21 RX ADMIN — Medication 100 MILLIGRAM(S): at 13:16

## 2024-08-21 RX ADMIN — Medication 5 MILLIGRAM(S): at 12:31

## 2024-08-21 RX ADMIN — Medication 2 MILLIGRAM(S): at 05:36

## 2024-08-21 RX ADMIN — Medication 4 MILLIGRAM(S): at 00:23

## 2024-08-21 RX ADMIN — Medication 5 MILLIGRAM(S): at 22:12

## 2024-08-21 RX ADMIN — POLYETHYLENE GLYCOL 3350 17 GRAM(S): 17 POWDER, FOR SOLUTION ORAL at 05:36

## 2024-08-21 RX ADMIN — ENOXAPARIN SODIUM 30 MILLIGRAM(S): 100 INJECTION SUBCUTANEOUS at 17:02

## 2024-08-21 RX ADMIN — ACETAMINOPHEN 650 MILLIGRAM(S): 325 TABLET ORAL at 17:30

## 2024-08-21 RX ADMIN — POLYETHYLENE GLYCOL 3350 17 GRAM(S): 17 POWDER, FOR SOLUTION ORAL at 17:01

## 2024-08-21 RX ADMIN — Medication 100 MILLIGRAM(S): at 16:56

## 2024-08-21 RX ADMIN — ACETAMINOPHEN 650 MILLIGRAM(S): 325 TABLET ORAL at 16:58

## 2024-08-21 RX ADMIN — Medication 2 TABLET(S): at 22:12

## 2024-08-21 NOTE — DIETITIAN NUTRITION RISK NOTIFICATION - ADDITIONAL COMMENTS/DIETITIAN RECOMMENDATIONS
Please see Dietitian Initial Assessment for complete recommendations.     Joanne Reid MS RDN CDN  On Microsoft Teams, Pager #[phone]

## 2024-08-21 NOTE — DIETITIAN INITIAL EVALUATION ADULT - OTHER INFO
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    Patient is currently ordered for a PO diet with easy to chew textures and Ensure Enlive BID supplementation. As noted above, patient reports chewing and swallowing difficulty; recommend SLP evaluation to optimize diet. Documented on RN flowsheet as consuming 51-75% of meals. Patient reports she enjoys the Ensure supplements. No report of GI distress (nausea, vomiting, diarrhea, constipation). Last BM 8/20/24 per RN flowsheet documentation. Noted to be on a bowel regimen.     Writer provided verbal education regarding current diet order and nutrition recommendations for after discharge, including a high kcal/protein dietary pattern and strategies to promote PO intake in the setting of poor appetite. Patient verbalized understanding to the discussion.

## 2024-08-21 NOTE — DIETITIAN INITIAL EVALUATION ADULT - PERTINENT MEDS FT
MEDICATIONS  (STANDING):  bisacodyl 5 milliGRAM(s) Oral at bedtime  cefTRIAXone   IVPB 1000 milliGRAM(s) IV Intermittent every 24 hours  enoxaparin Injectable 30 milliGRAM(s) SubCutaneous every 24 hours  metroNIDAZOLE  IVPB      metroNIDAZOLE  IVPB 500 milliGRAM(s) IV Intermittent every 8 hours  polyethylene glycol 3350 17 Gram(s) Oral two times a day  senna 2 Tablet(s) Oral at bedtime    MEDICATIONS  (PRN):  acetaminophen     Tablet .. 650 milliGRAM(s) Oral every 6 hours PRN Temp greater or equal to 38C (100.4F), Mild Pain (1 - 3)  witch hazel Pads 1 Application(s) Topical four times a day PRN rectal pain

## 2024-08-21 NOTE — PHYSICAL THERAPY INITIAL EVALUATION ADULT - NSPTDISCHREC_GEN_A_CORE
Patient appears to be at or near baseline. Patient will not be placed on PT program for this reason. Please reconsult should anything change./No skilled PT needs

## 2024-08-21 NOTE — PROGRESS NOTE ADULT - PROBLEM SELECTOR PLAN 1
2/2 stercoral colitis  Leukocytosis and tachycardia. Clinically much improved, however will obtain bcx and start empiric abx out of precaution given severity of imaging findings and degree of leukocytosis. No lactate on admission. BP soft. Surgery consulted severe stercoral colitis, no surgical intervention necessary.  - sepsis likely reactive in s.o stool impaction and pain  - s/p IVF  - empiric CTX and flagyl  - f/u bcx-pending   - bowel regimen as below

## 2024-08-21 NOTE — PROGRESS NOTE ADULT - PROBLEM SELECTOR PLAN 3
Likely 2/2 severe constipation. UA non infectious. Alaniz placed in ED  - TOV attempted passed but with intermittent hesitancy due to anticipation of pain   - cont to monitor bladder scan

## 2024-08-21 NOTE — PROGRESS NOTE ADULT - NUTRITIONAL ASSESSMENT
This patient has been assessed with a concern for Malnutrition and has been determined to have a diagnosis/diagnoses of Severe protein-calorie malnutrition and Underweight (BMI < 19).    This patient is being managed with:   Diet Regular-  Easy to Chew (EASYTOCHEW)  Supplement Feeding Modality:  Oral  Ensure Enlive Cans or Servings Per Day:  2       Frequency:  Daily  Entered: Aug 20 2024  4:05PM

## 2024-08-21 NOTE — DIETITIAN INITIAL EVALUATION ADULT - NS FNS DIET ORDER
Regular: Easy to Chew (EASYTOCHEW)  Supplement Feeding Modality:  Oral  Ensure Enlive Cans or Servings Per Day:  2       Frequency:  Daily (08-20-24 @ 16:07)

## 2024-08-21 NOTE — DIETITIAN INITIAL EVALUATION ADULT - OTHER CALCULATIONS
IBW: 115 lb +/- 10%, %IBW 87%  No weight history available per review of [facility].   Patient reports usual body weight 110-112lb (9%), and endorses weight loss to reported current weight of </=100lb x1 month (significant)

## 2024-08-21 NOTE — PROGRESS NOTE ADULT - ASSESSMENT
Assessment: 63yr F w/ PMHx of right parotidectomy w/ chemo/radiation therapy (last session 03/04/2024) presenting with concern for stercoral colitis.    Plan:  - Patient having bowel function and with benign abdominal exam  - WBC improved, now 6.7 today  - Continue bowel regimen and abx  - Will sign off at this time, please call back if further questions or concerns    Plan discussed with on call B Team attending  B Team Surgery  [id number]  
63F w/ HTN, psoriasis and hx SCC of parotid s/p R parotidectomy and chemoRT (last 2/2024) who presents for abdominal pain, admitted for sepsis 2/2 stercoral colitis.

## 2024-08-21 NOTE — DIETITIAN INITIAL EVALUATION ADULT - ADD RECOMMEND
1. Recommend SLP evaluation to optimize diet, follow recommendations  2. Continue Ensure Enlive (provides 350kcal, 20gms protein per serving) BID as acceptable per SLP  3. Monitor weights, labs, BM's, skin integrity, PO intake   4. Please monitor % PO intake on flowsheets   5. Honor food preferences as able within therapeutic diet order.

## 2024-08-21 NOTE — PHYSICAL THERAPY INITIAL EVALUATION ADULT - ADDITIONAL COMMENTS
Patient lives in an apartment with  and son, +few steps. Patient denies falls, is independent in ADLs and ambulation.    Patient left sitting at the edge of bed in NAD, +call bell.

## 2024-08-21 NOTE — PHYSICAL THERAPY INITIAL EVALUATION ADULT - SITTING BALANCE: STATIC
[de-identified] : Passed CCHD and Hearing Screen good balance Dutasteride Male Counseling: Dustasteride Counseling:  I discussed with the patient the risks of use of dutasteride including but not limited to decreased libido, decreased ejaculate volume, and gynecomastia. Women who can become pregnant should not handle medication.  All of the patient's questions and concerns were addressed. Dutasteride Counseling: Dustasteride Counseling:  I discussed with the patient the risks of use of dutasteride including but not limited to decreased libido, decreased ejaculate volume, and gynecomastia. Women who can become pregnant should not handle medication.  All of the patient's questions and concerns were addressed.

## 2024-08-21 NOTE — DIETITIAN INITIAL EVALUATION ADULT - PERTINENT LABORATORY DATA
08-21    136  |  100  |  7   ----------------------------<  82  3.9   |  25  |  0.57    Ca    8.4      21 Aug 2024 06:27  Phos  2.8     08-21  Mg     1.90     08-21    TPro  6.4  /  Alb  3.7  /  TBili  0.8  /  DBili  x   /  AST  27  /  ALT  22  /  AlkPhos  107  08-19

## 2024-08-21 NOTE — DIETITIAN INITIAL EVALUATION ADULT - ORAL INTAKE PTA/DIET HISTORY
Patient reports no known food allergies or food intolerances. Patient does not take any nutrition supplements at home. Patient reports chewing and swallowing difficulty x1 month secondary to "tongue weakness." Reports consuming mostly liquid-like foods such as yogurts and soups with decreased PO intake overall. Likely meeting <75% estimated energy needs as a result

## 2024-08-21 NOTE — PHYSICAL THERAPY INITIAL EVALUATION ADULT - GENERAL OBSERVATIONS, REHAB EVAL
Patient received sitting at the edge of bed, in NAD, agreeable to participate. 99% oxygen saturation on room air.

## 2024-08-22 ENCOUNTER — APPOINTMENT (OUTPATIENT)
Dept: HEMATOLOGY ONCOLOGY | Facility: CLINIC | Age: 64
End: 2024-08-22

## 2024-08-22 ENCOUNTER — TRANSCRIPTION ENCOUNTER (OUTPATIENT)
Age: 64
End: 2024-08-22

## 2024-08-22 VITALS
DIASTOLIC BLOOD PRESSURE: 75 MMHG | TEMPERATURE: 99 F | SYSTOLIC BLOOD PRESSURE: 108 MMHG | OXYGEN SATURATION: 98 % | RESPIRATION RATE: 16 BRPM | HEART RATE: 79 BPM

## 2024-08-22 LAB
ANION GAP SERPL CALC-SCNC: 11 MMOL/L — SIGNIFICANT CHANGE UP (ref 7–14)
ANION GAP SERPL CALC-SCNC: 12 MMOL/L — SIGNIFICANT CHANGE UP (ref 7–14)
BLD GP AB SCN SERPL QL: NEGATIVE — SIGNIFICANT CHANGE UP
BUN SERPL-MCNC: 7 MG/DL — SIGNIFICANT CHANGE UP (ref 7–23)
BUN SERPL-MCNC: 8 MG/DL — SIGNIFICANT CHANGE UP (ref 7–23)
CALCIUM SERPL-MCNC: 6.4 MG/DL — CRITICAL LOW (ref 8.4–10.5)
CALCIUM SERPL-MCNC: 8.8 MG/DL — SIGNIFICANT CHANGE UP (ref 8.4–10.5)
CHLORIDE SERPL-SCNC: 106 MMOL/L — SIGNIFICANT CHANGE UP (ref 98–107)
CHLORIDE SERPL-SCNC: 109 MMOL/L — HIGH (ref 98–107)
CO2 SERPL-SCNC: 19 MMOL/L — LOW (ref 22–31)
CO2 SERPL-SCNC: 24 MMOL/L — SIGNIFICANT CHANGE UP (ref 22–31)
CREAT SERPL-MCNC: 0.39 MG/DL — LOW (ref 0.5–1.3)
CREAT SERPL-MCNC: 0.54 MG/DL — SIGNIFICANT CHANGE UP (ref 0.5–1.3)
EGFR: 105 ML/MIN/1.73M2 — SIGNIFICANT CHANGE UP
EGFR: 113 ML/MIN/1.73M2 — SIGNIFICANT CHANGE UP
GLUCOSE SERPL-MCNC: 83 MG/DL — SIGNIFICANT CHANGE UP (ref 70–99)
GLUCOSE SERPL-MCNC: 91 MG/DL — SIGNIFICANT CHANGE UP (ref 70–99)
HCT VFR BLD CALC: 27.6 % — LOW (ref 34.5–45)
HCT VFR BLD CALC: 37.3 % — SIGNIFICANT CHANGE UP (ref 34.5–45)
HGB BLD-MCNC: 12.6 G/DL — SIGNIFICANT CHANGE UP (ref 11.5–15.5)
HGB BLD-MCNC: 9.5 G/DL — LOW (ref 11.5–15.5)
MAGNESIUM SERPL-MCNC: 1.4 MG/DL — LOW (ref 1.6–2.6)
MAGNESIUM SERPL-MCNC: 1.9 MG/DL — SIGNIFICANT CHANGE UP (ref 1.6–2.6)
MCHC RBC-ENTMCNC: 28.2 PG — SIGNIFICANT CHANGE UP (ref 27–34)
MCHC RBC-ENTMCNC: 28.9 PG — SIGNIFICANT CHANGE UP (ref 27–34)
MCHC RBC-ENTMCNC: 33.8 GM/DL — SIGNIFICANT CHANGE UP (ref 32–36)
MCHC RBC-ENTMCNC: 34.4 GM/DL — SIGNIFICANT CHANGE UP (ref 32–36)
MCV RBC AUTO: 83.4 FL — SIGNIFICANT CHANGE UP (ref 80–100)
MCV RBC AUTO: 83.9 FL — SIGNIFICANT CHANGE UP (ref 80–100)
NRBC # BLD: 0 /100 WBCS — SIGNIFICANT CHANGE UP (ref 0–0)
NRBC # BLD: 0 /100 WBCS — SIGNIFICANT CHANGE UP (ref 0–0)
NRBC # FLD: 0 K/UL — SIGNIFICANT CHANGE UP (ref 0–0)
NRBC # FLD: 0 K/UL — SIGNIFICANT CHANGE UP (ref 0–0)
PHOSPHATE SERPL-MCNC: 3.5 MG/DL — SIGNIFICANT CHANGE UP (ref 2.5–4.5)
PHOSPHATE SERPL-MCNC: 5.8 MG/DL — HIGH (ref 2.5–4.5)
PLATELET # BLD AUTO: 190 K/UL — SIGNIFICANT CHANGE UP (ref 150–400)
PLATELET # BLD AUTO: 260 K/UL — SIGNIFICANT CHANGE UP (ref 150–400)
POTASSIUM SERPL-MCNC: 2.4 MMOL/L — CRITICAL LOW (ref 3.5–5.3)
POTASSIUM SERPL-MCNC: 3.2 MMOL/L — LOW (ref 3.5–5.3)
POTASSIUM SERPL-SCNC: 2.4 MMOL/L — CRITICAL LOW (ref 3.5–5.3)
POTASSIUM SERPL-SCNC: 3.2 MMOL/L — LOW (ref 3.5–5.3)
RBC # BLD: 3.29 M/UL — LOW (ref 3.8–5.2)
RBC # BLD: 4.47 M/UL — SIGNIFICANT CHANGE UP (ref 3.8–5.2)
RBC # FLD: 13.3 % — SIGNIFICANT CHANGE UP (ref 10.3–14.5)
RBC # FLD: 13.5 % — SIGNIFICANT CHANGE UP (ref 10.3–14.5)
RH IG SCN BLD-IMP: POSITIVE — SIGNIFICANT CHANGE UP
SODIUM SERPL-SCNC: 140 MMOL/L — SIGNIFICANT CHANGE UP (ref 135–145)
SODIUM SERPL-SCNC: 141 MMOL/L — SIGNIFICANT CHANGE UP (ref 135–145)
WBC # BLD: 3.56 K/UL — LOW (ref 3.8–10.5)
WBC # BLD: 5.25 K/UL — SIGNIFICANT CHANGE UP (ref 3.8–10.5)
WBC # FLD AUTO: 3.56 K/UL — LOW (ref 3.8–10.5)
WBC # FLD AUTO: 5.25 K/UL — SIGNIFICANT CHANGE UP (ref 3.8–10.5)

## 2024-08-22 PROCEDURE — 99239 HOSP IP/OBS DSCHRG MGMT >30: CPT

## 2024-08-22 RX ORDER — POLYETHYLENE GLYCOL 3350 17 G/17G
17 POWDER, FOR SOLUTION ORAL
Qty: 1020 | Refills: 0
Start: 2024-08-22 | End: 2024-09-20

## 2024-08-22 RX ORDER — POTASSIUM CHLORIDE 10 MEQ
1 TABLET, EXT RELEASE, PARTICLES/CRYSTALS ORAL
Qty: 2 | Refills: 0
Start: 2024-08-22 | End: 2024-08-23

## 2024-08-22 RX ORDER — POTASSIUM CHLORIDE 10 MEQ
40 TABLET, EXT RELEASE, PARTICLES/CRYSTALS ORAL EVERY 4 HOURS
Refills: 0 | Status: COMPLETED | OUTPATIENT
Start: 2024-08-22 | End: 2024-08-22

## 2024-08-22 RX ORDER — METRONIDAZOLE 250 MG
1 TABLET ORAL
Qty: 9 | Refills: 0
Start: 2024-08-22 | End: 2024-08-24

## 2024-08-22 RX ORDER — SENNA 187 MG
2 TABLET ORAL
Qty: 60 | Refills: 0
Start: 2024-08-22 | End: 2024-09-20

## 2024-08-22 RX ORDER — TRAMADOL HYDROCHLORIDE 200 MG/1
25 TABLET, EXTENDED RELEASE ORAL ONCE
Refills: 0 | Status: DISCONTINUED | OUTPATIENT
Start: 2024-08-22 | End: 2024-08-22

## 2024-08-22 RX ADMIN — TRAMADOL HYDROCHLORIDE 25 MILLIGRAM(S): 200 TABLET, EXTENDED RELEASE ORAL at 04:24

## 2024-08-22 RX ADMIN — ACETAMINOPHEN 650 MILLIGRAM(S): 325 TABLET ORAL at 03:10

## 2024-08-22 RX ADMIN — POLYETHYLENE GLYCOL 3350 17 GRAM(S): 17 POWDER, FOR SOLUTION ORAL at 06:01

## 2024-08-22 RX ADMIN — Medication 40 MILLIEQUIVALENT(S): at 13:34

## 2024-08-22 RX ADMIN — TRAMADOL HYDROCHLORIDE 25 MILLIGRAM(S): 200 TABLET, EXTENDED RELEASE ORAL at 05:15

## 2024-08-22 RX ADMIN — Medication 40 MILLIEQUIVALENT(S): at 12:02

## 2024-08-22 RX ADMIN — Medication 100 MILLIGRAM(S): at 13:35

## 2024-08-22 RX ADMIN — ACETAMINOPHEN 650 MILLIGRAM(S): 325 TABLET ORAL at 02:28

## 2024-08-22 RX ADMIN — Medication 100 MILLIGRAM(S): at 06:01

## 2024-08-22 NOTE — SWALLOW BEDSIDE ASSESSMENT ADULT - COMMENTS
Adult Hospitalist 8/22: "63F w/ HTN, psoriasis and hx SCC of parotid s/p R parotidectomy and chemoRT (last 2/2024) who presents for abdominal pain, admitted for sepsis 2/2 stercoral colitis."    No chest imaging this admission.    Of Note: Pt known to ENT and Speech/Swallow Outpatient Clinic (see notes for details). Pt had clinical swallow assessment completed 8/19/24 at Outpatient Speech/Swallow Clinic with recommendations to continue regular diet/thin liquids (see note for details).    Pt received sitting upright in bed, breakfast tray at bedside. Pt AOx3, cooperative and agreeable to exam. Pt provided history; reported completing radiation therapy for the second time in Feb 2024, and reports new onset tongue weakness, feeling swollen, and slurred speech.

## 2024-08-22 NOTE — DISCHARGE NOTE PROVIDER - NSDCFUADDAPPT_GEN_ALL_CORE_FT
Follow up with your primary care provider in 1-2 weeks of discharge.    Follow up with your Speech Language Pathologist on discharge. You may call Radiology Scheduling Office Phone #[phone] and Fax# [phone] to schedule a cinesophagram. Call outpatient Speech/Swallow Clinic at [facility] #[phone] to schedule an appointment.

## 2024-08-22 NOTE — DISCHARGE NOTE PROVIDER - NSDCMRMEDTOKEN_GEN_ALL_CORE_FT
amLODIPine 10 mg oral tablet: 1 tab(s) orally once a day  Otezla 30 mg oral tablet: 1 tab(s) orally 2 times a day   bisacodyl 5 mg oral delayed release tablet: 1 tab(s) orally once a day (at bedtime)  Cipro 250 mg oral tablet: 1 tab(s) orally 2 times a day through 8/24  metroNIDAZOLE 500 mg oral tablet: 1 tab(s) orally every 8 hours through 8/24  Otezla 30 mg oral tablet: 1 tab(s) orally 2 times a day  polyethylene glycol 3350 oral powder for reconstitution: 17 gram(s) orally 2 times a day  Potassium Chloride (Eqv-K-Tab) 20 mEq oral tablet, extended release: 1 tab(s) orally once a day  senna leaf extract oral tablet: 2 tab(s) orally once a day (at bedtime)  cks Multi Care Relief topical kit: Apply topically to affected area 4 times a day as needed for rectal pain

## 2024-08-22 NOTE — DISCHARGE NOTE NURSING/CASE MANAGEMENT/SOCIAL WORK - NSDCFUADDAPPT_GEN_ALL_CORE_FT
Follow up with your primary care provider in 1-2 weeks of discharge.    Follow up with your Speech Language Pathologist on discharge. You may call Radiology Scheduling Office Phone #[phone] and Fax# [phone] to schedule a cinesophagram. Call outpatient Speech/Swallow Clinic at [facility] #[phone] to schedule an appointment.

## 2024-08-22 NOTE — DISCHARGE NOTE PROVIDER - HOSPITAL COURSE
HPI:  63F w/ HTN, psoriasis and hx SCC of parotid s/p R parotidectomy and chemoRT (last 2/2024) who presents for abdominal pain. Pt c/o diarrhea, rectal pain, urinary retention and diffuse abdominal pain since 8/16. Denies fevers, chills, cp, sob, n/v, or paresthesias. Able to tolerate PO however poor appetite. In the ED, initially tachycardic. Labs significant for WBC 20. CTAP with significant rectal stool impaction and stercoral colitis. S/p enema and manual disimpaction significant relief. Pt endorses two additional large BM afterwards. She states that pain has completely resolved post BM. Surgery was consulted re ischemic changes, no surgical intervention. Admitted to medicine for further management. (20 Aug 2024 17:25)    Hospital Course:  On admission, met sepsis criteria given tachycardia, leukocytosis and colitis on imaging. symptoms improved with manual disimpaction and aggressive bowel regimen. Evaluated by Surgery, no surgical intervention needed. also noted with urinary retention dt severe constipation, passed TOV. Treated empirically with Ceftriaxone and flagyl, plan to switch to cipro/flagyl on dc for total 5d.     Discharge Diagnoses:  Sterco colitis   severe constipation  Sepsis, met criteria on admission likely reactive dt severe constipation and pain  Urinary retention

## 2024-08-22 NOTE — DISCHARGE NOTE PROVIDER - NSDCFUSCHEDAPPT_GEN_ALL_CORE_FT
Shania Nieves  [facility]  [facility]  Scheduled Appointment: 08/22/2024    Angel Hernandez  [facility]  OTOLARYNG [address]  Scheduled Appointment: 08/27/2024    Jeff Harley  [facility]  RADMED [address]  Scheduled Appointment: 10/17/2024     Angel Hernandez  [facility] Physician [facility]  OT[address]NG [address] [facility]  Scheduled Appointment: 08/27/2024    Jeff Harley  [facility]  RADMED [address]  Scheduled Appointment: 10/17/2024

## 2024-08-22 NOTE — DISCHARGE NOTE PROVIDER - CARE PROVIDERS DIRECT ADDRESSES
[email or web address]

## 2024-08-22 NOTE — DISCHARGE NOTE PROVIDER - DETAILS OF MALNUTRITION DIAGNOSIS/DIAGNOSES
This patient has been assessed with a concern for Malnutrition and was treated during this hospitalization for the following Nutrition diagnosis/diagnoses:     -  08/21/2024: Severe protein-calorie malnutrition   -  08/21/2024: Underweight (BMI < 19)

## 2024-08-22 NOTE — SWALLOW BEDSIDE ASSESSMENT ADULT - ADDITIONAL RECOMMENDATIONS
Medical team advised to reconsult service if patient exhibits change in medical condition impacting oral diet tolerance. This service to follow up for diet tolerance as schedule permits.      SLP provided Medicine team with information regarding scheduling Outpatient Modified Barium Swallow study/Cinesophagram pending discharge plans. Please provide script to pt/family and following information on dc plan summary: Radiology Scheduling Office Phone #[phone] and Fax# [phone]. Outpatient Speech/Swallow Clinic at [facility] #[phone] for pt/family to call and schedule appt date/time.

## 2024-08-22 NOTE — DISCHARGE NOTE NURSING/CASE MANAGEMENT/SOCIAL WORK - PATIENT PORTAL LINK FT
You can access the FollowMyHealth Patient Portal offered by [facility] by registering at the following website: [email or web address]. By joining [facility]’s FollowMyHealth portal, you will also be able to view your health information using other applications (apps) compatible with our system.

## 2024-08-22 NOTE — DISCHARGE NOTE PROVIDER - CARE PROVIDER_API CALL
Nemo Rodríguez  [facility]  [address] [place] [address]  Phone: [phone]  Fax: [phone]  Follow Up Time:     Jeff Harley  Radiation Oncology  [address] [facility] [address]- [place] [address]  Phone: [phone]  Fax: [phone]  Follow Up Time:     Angel Hernandez  Otolaryngology  [address] [place] [address]  Phone: [phone]  Fax: [phone]  Follow Up Time:

## 2024-08-22 NOTE — DISCHARGE NOTE PROVIDER - PROVIDER TOKENS
PROVIDER:[TOKEN:[[id number]]],PROVIDER:[TOKEN:[[id number]]],PROVIDER:[TOKEN:[[id number]]]

## 2024-08-22 NOTE — SWALLOW BEDSIDE ASSESSMENT ADULT - SWALLOW EVAL: DIAGNOSIS
1. Functional oral stage for regular solids, mildly thick, and thin liquids characterized by adequate oral retrieval, slow and thorough mastication of solid, adequate bolus collection, posterior transfer, and oral clearance. 2. Functional pharyngeal stage suspected for the above noted consistencies characterized by initiation of the pharyngeal swallow and hyolaryngeal excursion upon digital palpation with no overt clinical s/s airway penetration/aspiration. Pt with baseline throat clear which appears habitual and was not exacerbated during PO trials.

## 2024-08-22 NOTE — DISCHARGE NOTE PROVIDER - NSDCCPCAREPLAN_GEN_ALL_CORE_FT
PRINCIPAL DISCHARGE DIAGNOSIS  Diagnosis: Stercoral colitis  Assessment and Plan of Treatment: You were noted to have severe constipation. Your symptoms improved with bowel regimen and manual disimpaction. Please continue to maintain oral hydration, take laxatives as needed.

## 2024-08-25 LAB
CULTURE RESULTS: SIGNIFICANT CHANGE UP
SPECIMEN SOURCE: SIGNIFICANT CHANGE UP

## 2024-08-27 ENCOUNTER — APPOINTMENT (OUTPATIENT)
Dept: OTOLARYNGOLOGY | Facility: CLINIC | Age: 64
End: 2024-08-27
Payer: MEDICAID

## 2024-08-27 VITALS
HEIGHT: 63 IN | BODY MASS INDEX: 17.72 KG/M2 | SYSTOLIC BLOOD PRESSURE: 111 MMHG | HEART RATE: 78 BPM | DIASTOLIC BLOOD PRESSURE: 78 MMHG | OXYGEN SATURATION: 98 % | WEIGHT: 100 LBS

## 2024-08-27 DIAGNOSIS — H69.93 UNSPECIFIED EUSTACHIAN TUBE DISORDER, BILATERAL: ICD-10-CM

## 2024-08-27 DIAGNOSIS — E04.1 NONTOXIC SINGLE THYROID NODULE: ICD-10-CM

## 2024-08-27 DIAGNOSIS — C11.9 MALIGNANT NEOPLASM OF NASOPHARYNX, UNSPECIFIED: ICD-10-CM

## 2024-08-27 DIAGNOSIS — C77.0 SECONDARY AND UNSPECIFIED MALIGNANT NEOPLASM OF LYMPH NODES OF HEAD, FACE AND NECK: ICD-10-CM

## 2024-08-27 DIAGNOSIS — L30.9 DERMATITIS, UNSPECIFIED: ICD-10-CM

## 2024-08-27 DIAGNOSIS — L40.9 PSORIASIS, UNSPECIFIED: ICD-10-CM

## 2024-08-27 PROCEDURE — 99214 OFFICE O/P EST MOD 30 MIN: CPT | Mod: 25

## 2024-08-27 PROCEDURE — 31231 NASAL ENDOSCOPY DX: CPT

## 2024-08-27 NOTE — PHYSICAL EXAM
[Nasal Endoscopy Performed] : nasal endoscopy was performed, see procedure section for findings [Midline] : trachea located in midline position [Normal] : no rashes [de-identified] : Posttreatment changes, incision healing well, no LAD.   [de-identified] : Psoriactic changes along the conchal bowl AS.  [de-identified] : Slight weakness of the marginal nerve on the right.

## 2024-08-27 NOTE — REASON FOR VISIT
[Subsequent Evaluation] : a subsequent evaluation for [FreeTextEntry2] : s/p revision right parotidectomy  on RT

## 2024-08-27 NOTE — HISTORY OF PRESENT ILLNESS
[de-identified] : 63 year old female with history of NPC and benign thyroid nodule. s/p revision right parotidectomy on 12/1/2021.  CT Chest 05/10/24-Interval decreased size of previously described medial right middle lobe nodular opacity, ill-defined and likely representing subsegmental atelectasis. No new, enlarging or suspicious pulmonary nodule. MRI 5/10/24-  Right post parotidectomy changes without evidence of recurrent disease. Left level I prominent lymph node smaller from the most recent exam and stable from the earlier study. No other cervical adenopathy. Patient is now post revision right parotidectomy on 11/1/2023 for susp of mets nodes, path c/w Metastatic SCCa, non-keratinizing, involving fibroadipose tissue and lymphoid tissue, Seven lymph nodes negative for metastatic carcinoma ,In-situ hybridization for MINO performed on block 1C is negative. Completed chemo and RT on 2/23/24. Continues to f/u with Dr. Nieves and Dr. Harley.   Pt is here for 3 months f/u Has been following with Carolina CONKLIN. Has appointment with her today.  Slight improvement in eating and drinking.

## 2024-08-27 NOTE — PROCEDURE
[Dysphagia] : dysphagia not clearly evaluated by indirect laryngoscopy [None] : none [Flexible Endoscope] : examined with the flexible endoscope [Serial Number: ___] : Serial Number: [unfilled] [FreeTextEntry6] : After patient consents, a nasal endoscopy is performed.  No lesions in the NC or NPx. Posttreatment changes are noted. No evidence of purulence from the OMCs.  [de-identified] : NPC [de-identified] : After patient consents, a FFL is performed.  No lesions in the OPx, HPx or larynx.  Expected posttreatment changes, VC are mobile, airway patent.

## 2024-09-04 NOTE — ED ADULT NURSE REASSESSMENT NOTE - AS PAIN REST
Left Voicemail (1st Attempt) for the patient to call back and schedule the following:    Appointment type: return   Provider: Dr. Lay   Return date: next available   Specialty phone number: [phone]  Additional appointment(s) needed:   Additonal Notes:    
9 (severe pain)
4 (moderate pain)

## 2024-09-16 ENCOUNTER — APPOINTMENT (OUTPATIENT)
Age: 64
End: 2024-09-16

## 2024-09-16 PROCEDURE — D9310: CPT

## 2024-09-16 RX ORDER — NALOXONE HYDROCHLORIDE 4 MG/.1ML
4 SPRAY NASAL
Qty: 1 | Refills: 0 | Status: ACTIVE | COMMUNITY
Start: 2024-09-16 | End: 1900-01-01

## 2024-09-24 ENCOUNTER — APPOINTMENT (OUTPATIENT)
Dept: OTOLARYNGOLOGY | Facility: CLINIC | Age: 64
End: 2024-09-24
Payer: MEDICAID

## 2024-09-24 PROCEDURE — 92526 ORAL FUNCTION THERAPY: CPT | Mod: GN

## 2024-10-17 NOTE — ASU PREOP CHECKLIST - DNR CLARIFICATION FORM COMPLETED
[] [facility]  Outpatient Rehabilitation &  Therapy  [address]  [phone]  [phone] [] [facility]  Outpatient Rehabilitation &  Therapy  [address]  P: [phone]  F: [phone] [x] [facility]  Outpatient Rehabilitation &  Therapy  [address]  P: [phone]  F: [phone] [] [facility]  Outpatient Rehabilitation &  Therapy  [address]  [phone]  [phone] [] [facility]  Outpatient Rehabilitation &  Therapy  [address]   P: [phone]  F: [phone]  [] [facility]  Outpatient Rehabilitation &  Therapy  [address]  P: [phone]  F: [phone] [] [facility]  Outpatient Rehabilitation &  Therapy  [address]  P: [phone]  F: [phone] [] [facility]  Outpatient Rehabilitation &  Therapy  [address]  P: [phone]  F: [phone] [] [facility]  Outpatient Rehabilitation &  Therapy  [address]  P: [phone]  F: [phone]  [] [facility] Outpatient Rehabilitation &  Therapy  [address]  P: [phone]  F: [phone]     Physical Therapy Daily Treatment Note    Date:  10/17/2024  Patient Name:  Katja Isidro    :  1944  MRN: [id number]  Physician: Moise Brambila MD                   Insurance: [facility] Medicare - Visits BMN - No Hard Max - Auth After Eval, Received auth approval for 20vs of PT from 10/9-24 auth#[id number]   Medical Diagnosis: M16.12 - Primary OA of L hip               Rehab Codes: M25.55, M25.65, R26.2  Onset date: 10/1/24               Next 's appt.: prn  Visit# / total visits: 3/20    Cancels/No Shows: 0    Subjective:    Pain:  [] Yes  [x] No Location: L hip  Pain Rating: (0-10 scale) 0/10  Pain altered Tx:  [x] No  [] 
n/a

## 2024-10-18 ENCOUNTER — APPOINTMENT (OUTPATIENT)
Dept: HEMATOLOGY ONCOLOGY | Facility: CLINIC | Age: 64
End: 2024-10-18
Payer: MEDICAID

## 2024-10-18 VITALS
WEIGHT: 101.41 LBS | OXYGEN SATURATION: 95 % | DIASTOLIC BLOOD PRESSURE: 89 MMHG | TEMPERATURE: 97.7 F | HEIGHT: 63 IN | RESPIRATION RATE: 16 BRPM | SYSTOLIC BLOOD PRESSURE: 137 MMHG | BODY MASS INDEX: 17.97 KG/M2 | HEART RATE: 82 BPM

## 2024-10-18 DIAGNOSIS — C11.9 MALIGNANT NEOPLASM OF NASOPHARYNX, UNSPECIFIED: ICD-10-CM

## 2024-10-18 PROCEDURE — 99214 OFFICE O/P EST MOD 30 MIN: CPT

## 2024-11-07 NOTE — PROGRESS NOTE ADULT - PROBLEM/PLAN-1
Health Maintenance       Depression Screening (Yearly)  Never done    Shingles Vaccine (1 of 2)  Never done    Hepatitis C Screening (Once)  Never done    DTaP/Tdap/Td Vaccine (1 - Tdap)  Overdue since 12/22/2022    Osteoporosis Screening (Once)  Never done    Pneumococcal Vaccine 65+ (1 of 1 - PCV)  Never done    Influenza Vaccine (1)  Never done    COVID-19 Vaccine (6 - 2024-25 season)  Overdue since 9/1/2024           Following review of the above:  Patient wishes to discuss with clinician: COVID-19, Dtap/Tdap/Td, Influenza, and Shingles    Note: Refer to final orders and clinician documentation.       DISPLAY PLAN FREE TEXT

## 2024-12-12 NOTE — H&P ADULT - NSHPPHYSICALEXAM_GEN_ALL_CORE
PHYSICAL EXAM:  Vital Signs Last 24 Hrs  T(C): 36.5 (20 Aug 2024 16:36), Max: 37.2 (20 Aug 2024 06:39)  T(F): 97.7 (20 Aug 2024 16:36), Max: 98.9 (20 Aug 2024 06:39)  HR: 96 (20 Aug 2024 16:36) (72 - 128)  BP: 98/67 (20 Aug 2024 16:36) (98/67 - 116/78)  BP(mean): --  RR: 18 (20 Aug 2024 16:36) (16 - 18)  SpO2: 100% (20 Aug 2024 16:36) (98% - 100%)    Parameters below as of 20 Aug 2024 16:36  Patient On (Oxygen Delivery Method): room air      CONSTITUTIONAL: NAD, well-groomed  EYES: EOMI, conjunctiva and sclera clear  ENMT: dry oral mucosa, thinning of perioral area  RESPIRATORY: Normal respiratory effort; lungs are clear to auscultation bilaterally  CARDIOVASCULAR: Regular rate and rhythm, no murmurs, no LE edema  ABDOMEN: Nontender to palpation, normoactive bowel sounds, no rebound/guarding  MUSCULOSKELETAL:  Normal gait; no clubbing or cyanosis of digits; no joint swelling or tenderness to palpation  PSYCH: A+O to person, place, and time; affect appropriate  NEUROLOGY: CN 2-12 are intact and symmetric; no gross sensory deficits   SKIN: No rashes; no palpable lesions No

## 2025-01-16 NOTE — DISCHARGE NOTE PROVIDER - NSDCACTIVITY_GEN_ALL_CORE
[FreeTextEntry1] : 47 year man with a history as listed presents for a followup cardiac evaluation.  Keshawn is complaining of atypical chest pain. His EKG did not reveal any significant ischemic changes. He will undergo a treadmill exercise stress test to define exercise tolerance, rule out exertional hypertensive responses, assess for exercise induced arrhythmias and rule out ischemia from obstructive CAD. He will get a 2d echo to rule out underlying structural heart disease.  He will need a Ca score for better risk stratification. His last LDL was 174. Will start on crestor 10mg Qday with Coq10.  He will followup with GI and surgery in regards to his gallbladder. Exercise and diet counseling was performed in order to reduce her future cardiovascular risk.  He will followup with me in 3 -4 months  or sooner if necessary.     [EKG obtained to assist in diagnosis and management of assessed problem(s)] : EKG obtained to assist in diagnosis and management of assessed problem(s) Activity as tolerated

## (undated) DEVICE — GLV 7 PROTEXIS (WHITE)

## (undated) DEVICE — VENODYNE/SCD SLEEVE CALF MEDIUM

## (undated) DEVICE — TONSIL ROLLS

## (undated) DEVICE — DRSG STERISTRIPS 0.5 X 4"

## (undated) DEVICE — LABELS BLANK W PEN

## (undated) DEVICE — ELCTR GROUNDING PAD ADULT COVIDIEN

## (undated) DEVICE — SUT VICRYL 3-0 27" SH UNDYED

## (undated) DEVICE — ELCTR BOVIE PENCIL SMOKE EVACUATION

## (undated) DEVICE — DRAPE SPLIT SHEET 77" X 120"

## (undated) DEVICE — SOL IRR POUR NS 0.9% 500ML

## (undated) DEVICE — ELCTR NDL SUBDERMAL 2 CHANNEL

## (undated) DEVICE — BIPOLAR FORCEP KIRWAN JEWELERS STR 4" X 0.4MM W 12FT CORD (GREEN)

## (undated) DEVICE — DRSG TEGADERM 4X4.75"

## (undated) DEVICE — PREP BETADINE SPONGE STICKS

## (undated) DEVICE — GLV 8 PROTEXIS (WHITE)

## (undated) DEVICE — ELCTR MONOPOLAR STIMULATOR PROBE FLUSH-TIP

## (undated) DEVICE — SOL IRR POUR H2O 1500ML

## (undated) DEVICE — DRAPE 3/4 SHEET 52X76"

## (undated) DEVICE — DRAPE MAGNETIC INSTRUMENT MEDIUM

## (undated) DEVICE — DRAPE TOWEL BLUE 17" X 24"

## (undated) DEVICE — WARMING BLANKET FULL ADULT

## (undated) DEVICE — STAPLER SKIN VISI-STAT 35 WIDE

## (undated) DEVICE — PACK HEAD & NECK

## (undated) DEVICE — DRSG BENZOIN 0.6CC

## (undated) DEVICE — CANISTER DISPOSABLE THIN WALL 3000CC